# Patient Record
Sex: FEMALE | Race: WHITE | NOT HISPANIC OR LATINO | URBAN - METROPOLITAN AREA
[De-identification: names, ages, dates, MRNs, and addresses within clinical notes are randomized per-mention and may not be internally consistent; named-entity substitution may affect disease eponyms.]

---

## 2018-10-02 PROBLEM — Z00.00 ENCOUNTER FOR PREVENTIVE HEALTH EXAMINATION: Status: ACTIVE | Noted: 2018-10-02

## 2018-10-03 ENCOUNTER — OUTPATIENT (OUTPATIENT)
Dept: OUTPATIENT SERVICES | Facility: HOSPITAL | Age: 43
LOS: 1 days | Discharge: HOME | End: 2018-10-03

## 2018-10-03 DIAGNOSIS — Z12.31 ENCOUNTER FOR SCREENING MAMMOGRAM FOR MALIGNANT NEOPLASM OF BREAST: ICD-10-CM

## 2018-12-28 ENCOUNTER — APPOINTMENT (OUTPATIENT)
Dept: CARDIOLOGY | Facility: CLINIC | Age: 43
End: 2018-12-28

## 2018-12-28 VITALS
WEIGHT: 209 LBS | SYSTOLIC BLOOD PRESSURE: 132 MMHG | BODY MASS INDEX: 34.82 KG/M2 | HEIGHT: 65 IN | DIASTOLIC BLOOD PRESSURE: 80 MMHG | HEART RATE: 58 BPM

## 2018-12-28 DIAGNOSIS — R07.9 CHEST PAIN, UNSPECIFIED: ICD-10-CM

## 2018-12-28 DIAGNOSIS — Z82.49 FAMILY HISTORY OF ISCHEMIC HEART DISEASE AND OTHER DISEASES OF THE CIRCULATORY SYSTEM: ICD-10-CM

## 2018-12-28 DIAGNOSIS — I10 ESSENTIAL (PRIMARY) HYPERTENSION: ICD-10-CM

## 2018-12-28 DIAGNOSIS — R94.31 ABNORMAL ELECTROCARDIOGRAM [ECG] [EKG]: ICD-10-CM

## 2018-12-28 NOTE — REASON FOR VISIT
[Consultation] : a consultation regarding [Abnormal ECG] : an abnormal ECG [Dyspnea] : dyspnea [Hypertension] : hypertension

## 2019-01-07 NOTE — HISTORY OF PRESENT ILLNESS
[FreeTextEntry1] : 44 yo female with pmhx as below is here for abn ecg eval\par Recent visit with pmd was told of abn ecg\par LAst few months recurrent brenner with mod act-s, as well as left sided chest pain, no other cvs complains\par et is stable\par non smoker\par +  fhx of cad\par ros is otherwise as below

## 2019-01-07 NOTE — PHYSICAL EXAM
[General Appearance - Well Developed] : well developed [Normal Appearance] : normal appearance [Well Groomed] : well groomed [General Appearance - Well Nourished] : well nourished [No Deformities] : no deformities [General Appearance - In No Acute Distress] : no acute distress [Normal Oral Mucosa] : normal oral mucosa [Normal Jugular Venous A Waves Present] : normal jugular venous A waves present [Normal Jugular Venous V Waves Present] : normal jugular venous V waves present [No Jugular Venous Nino A Waves] : no jugular venous nino A waves [Respiration, Rhythm And Depth] : normal respiratory rhythm and effort [Exaggerated Use Of Accessory Muscles For Inspiration] : no accessory muscle use [Auscultation Breath Sounds / Voice Sounds] : lungs were clear to auscultation bilaterally [Heart Rate And Rhythm] : heart rate and rhythm were normal [Heart Sounds] : normal S1 and S2 [Murmurs] : no murmurs present [Abdomen Soft] : soft [Abdomen Tenderness] : non-tender [Abdomen Mass (___ Cm)] : no abdominal mass palpated [Nail Clubbing] : no clubbing of the fingernails [Cyanosis, Localized] : no localized cyanosis [Petechial Hemorrhages (___cm)] : no petechial hemorrhages [] : no ischemic changes [Skin Color & Pigmentation] : normal skin color and pigmentation [Oriented To Time, Place, And Person] : oriented to person, place, and time

## 2019-01-07 NOTE — ASSESSMENT
[FreeTextEntry1] : 44 yo female with pmhx and presentation as above\par intermediate risk for cad\par stress echo, if low risk - med rx, mod to high risk - CCTA vs cath\par Aggressive risk modif\par Heart healthy diet and exercise program\par Obtain FLP/A1C, address accordingly\par RTC post testing

## 2019-01-31 ENCOUNTER — APPOINTMENT (OUTPATIENT)
Dept: CARDIOLOGY | Facility: CLINIC | Age: 44
End: 2019-01-31

## 2019-02-25 ENCOUNTER — APPOINTMENT (OUTPATIENT)
Dept: CARDIOLOGY | Facility: CLINIC | Age: 44
End: 2019-02-25

## 2019-03-12 ENCOUNTER — INPATIENT (INPATIENT)
Facility: HOSPITAL | Age: 44
LOS: 0 days | Discharge: HOME | End: 2019-03-13
Attending: COLON & RECTAL SURGERY | Admitting: COLON & RECTAL SURGERY
Payer: COMMERCIAL

## 2019-03-12 VITALS
RESPIRATION RATE: 18 BRPM | TEMPERATURE: 98 F | DIASTOLIC BLOOD PRESSURE: 63 MMHG | SYSTOLIC BLOOD PRESSURE: 136 MMHG | OXYGEN SATURATION: 97 % | HEART RATE: 64 BPM

## 2019-03-12 LAB
ALBUMIN SERPL ELPH-MCNC: 4 G/DL — SIGNIFICANT CHANGE UP (ref 3.5–5.2)
ALP SERPL-CCNC: 45 U/L — SIGNIFICANT CHANGE UP (ref 30–115)
ALT FLD-CCNC: 23 U/L — SIGNIFICANT CHANGE UP (ref 0–41)
ANION GAP SERPL CALC-SCNC: 11 MMOL/L — SIGNIFICANT CHANGE UP (ref 7–14)
APTT BLD: 27.8 SEC — SIGNIFICANT CHANGE UP (ref 27–39.2)
AST SERPL-CCNC: 20 U/L — SIGNIFICANT CHANGE UP (ref 0–41)
BASOPHILS # BLD AUTO: 0.01 K/UL — SIGNIFICANT CHANGE UP (ref 0–0.2)
BASOPHILS NFR BLD AUTO: 0.1 % — SIGNIFICANT CHANGE UP (ref 0–1)
BILIRUB SERPL-MCNC: 0.3 MG/DL — SIGNIFICANT CHANGE UP (ref 0.2–1.2)
BLD GP AB SCN SERPL QL: SIGNIFICANT CHANGE UP
BUN SERPL-MCNC: 16 MG/DL — SIGNIFICANT CHANGE UP (ref 10–20)
CALCIUM SERPL-MCNC: 8.5 MG/DL — SIGNIFICANT CHANGE UP (ref 8.5–10.1)
CHLORIDE SERPL-SCNC: 103 MMOL/L — SIGNIFICANT CHANGE UP (ref 98–110)
CO2 SERPL-SCNC: 23 MMOL/L — SIGNIFICANT CHANGE UP (ref 17–32)
CREAT SERPL-MCNC: 1 MG/DL — SIGNIFICANT CHANGE UP (ref 0.7–1.5)
EOSINOPHIL # BLD AUTO: 0.08 K/UL — SIGNIFICANT CHANGE UP (ref 0–0.7)
EOSINOPHIL NFR BLD AUTO: 0.9 % — SIGNIFICANT CHANGE UP (ref 0–8)
GLUCOSE SERPL-MCNC: 88 MG/DL — SIGNIFICANT CHANGE UP (ref 70–99)
HCT VFR BLD CALC: 37.9 % — SIGNIFICANT CHANGE UP (ref 37–47)
HGB BLD-MCNC: 12.7 G/DL — SIGNIFICANT CHANGE UP (ref 12–16)
IMM GRANULOCYTES NFR BLD AUTO: 0.3 % — SIGNIFICANT CHANGE UP (ref 0.1–0.3)
INR BLD: 0.93 RATIO — SIGNIFICANT CHANGE UP (ref 0.65–1.3)
LACTATE SERPL-SCNC: 1 MMOL/L — SIGNIFICANT CHANGE UP (ref 0.5–2.2)
LYMPHOCYTES # BLD AUTO: 1.74 K/UL — SIGNIFICANT CHANGE UP (ref 1.2–3.4)
LYMPHOCYTES # BLD AUTO: 20 % — LOW (ref 20.5–51.1)
MCHC RBC-ENTMCNC: 29.2 PG — SIGNIFICANT CHANGE UP (ref 27–31)
MCHC RBC-ENTMCNC: 33.5 G/DL — SIGNIFICANT CHANGE UP (ref 32–37)
MCV RBC AUTO: 87.1 FL — SIGNIFICANT CHANGE UP (ref 81–99)
MONOCYTES # BLD AUTO: 0.62 K/UL — HIGH (ref 0.1–0.6)
MONOCYTES NFR BLD AUTO: 7.1 % — SIGNIFICANT CHANGE UP (ref 1.7–9.3)
NEUTROPHILS # BLD AUTO: 6.2 K/UL — SIGNIFICANT CHANGE UP (ref 1.4–6.5)
NEUTROPHILS NFR BLD AUTO: 71.6 % — SIGNIFICANT CHANGE UP (ref 42.2–75.2)
NRBC # BLD: 0 /100 WBCS — SIGNIFICANT CHANGE UP (ref 0–0)
PLATELET # BLD AUTO: 163 K/UL — SIGNIFICANT CHANGE UP (ref 130–400)
POTASSIUM SERPL-MCNC: 4.6 MMOL/L — SIGNIFICANT CHANGE UP (ref 3.5–5)
POTASSIUM SERPL-SCNC: 4.6 MMOL/L — SIGNIFICANT CHANGE UP (ref 3.5–5)
PROT SERPL-MCNC: 6.8 G/DL — SIGNIFICANT CHANGE UP (ref 6–8)
PROTHROM AB SERPL-ACNC: 10.7 SEC — SIGNIFICANT CHANGE UP (ref 9.95–12.87)
RBC # BLD: 4.35 M/UL — SIGNIFICANT CHANGE UP (ref 4.2–5.4)
RBC # FLD: 12.7 % — SIGNIFICANT CHANGE UP (ref 11.5–14.5)
SODIUM SERPL-SCNC: 137 MMOL/L — SIGNIFICANT CHANGE UP (ref 135–146)
TYPE + AB SCN PNL BLD: SIGNIFICANT CHANGE UP
WBC # BLD: 8.68 K/UL — SIGNIFICANT CHANGE UP (ref 4.8–10.8)
WBC # FLD AUTO: 8.68 K/UL — SIGNIFICANT CHANGE UP (ref 4.8–10.8)

## 2019-03-12 PROCEDURE — 46050 I&D PERIANAL ABSCESS SUPFC: CPT

## 2019-03-12 PROCEDURE — 99223 1ST HOSP IP/OBS HIGH 75: CPT | Mod: 57

## 2019-03-12 RX ORDER — METRONIDAZOLE 500 MG
500 TABLET ORAL EVERY 8 HOURS
Qty: 0 | Refills: 0 | Status: DISCONTINUED | OUTPATIENT
Start: 2019-03-12 | End: 2019-03-13

## 2019-03-12 RX ORDER — METRONIDAZOLE 500 MG
500 TABLET ORAL ONCE
Qty: 0 | Refills: 0 | Status: COMPLETED | OUTPATIENT
Start: 2019-03-12 | End: 2019-03-12

## 2019-03-12 RX ORDER — CIPROFLOXACIN LACTATE 400MG/40ML
400 VIAL (ML) INTRAVENOUS ONCE
Qty: 0 | Refills: 0 | Status: DISCONTINUED | OUTPATIENT
Start: 2019-03-12 | End: 2019-03-12

## 2019-03-12 RX ORDER — CIPROFLOXACIN LACTATE 400MG/40ML
400 VIAL (ML) INTRAVENOUS EVERY 12 HOURS
Qty: 0 | Refills: 0 | Status: DISCONTINUED | OUTPATIENT
Start: 2019-03-12 | End: 2019-03-13

## 2019-03-12 RX ORDER — CIPROFLOXACIN LACTATE 400MG/40ML
VIAL (ML) INTRAVENOUS
Qty: 0 | Refills: 0 | Status: DISCONTINUED | OUTPATIENT
Start: 2019-03-12 | End: 2019-03-12

## 2019-03-12 RX ORDER — SODIUM CHLORIDE 9 MG/ML
1000 INJECTION, SOLUTION INTRAVENOUS
Qty: 0 | Refills: 0 | Status: DISCONTINUED | OUTPATIENT
Start: 2019-03-12 | End: 2019-03-12

## 2019-03-12 RX ORDER — HYDROMORPHONE HYDROCHLORIDE 2 MG/ML
1 INJECTION INTRAMUSCULAR; INTRAVENOUS; SUBCUTANEOUS
Qty: 0 | Refills: 0 | Status: DISCONTINUED | OUTPATIENT
Start: 2019-03-12 | End: 2019-03-12

## 2019-03-12 RX ORDER — INFLUENZA VIRUS VACCINE 15; 15; 15; 15 UG/.5ML; UG/.5ML; UG/.5ML; UG/.5ML
0.5 SUSPENSION INTRAMUSCULAR ONCE
Qty: 0 | Refills: 0 | Status: DISCONTINUED | OUTPATIENT
Start: 2019-03-12 | End: 2019-03-13

## 2019-03-12 RX ORDER — IBUPROFEN 200 MG
400 TABLET ORAL EVERY 8 HOURS
Qty: 0 | Refills: 0 | Status: DISCONTINUED | OUTPATIENT
Start: 2019-03-12 | End: 2019-03-13

## 2019-03-12 RX ORDER — SODIUM CHLORIDE 9 MG/ML
1000 INJECTION, SOLUTION INTRAVENOUS ONCE
Qty: 0 | Refills: 0 | Status: COMPLETED | OUTPATIENT
Start: 2019-03-12 | End: 2019-03-12

## 2019-03-12 RX ORDER — ONDANSETRON 8 MG/1
4 TABLET, FILM COATED ORAL ONCE
Qty: 0 | Refills: 0 | Status: DISCONTINUED | OUTPATIENT
Start: 2019-03-12 | End: 2019-03-12

## 2019-03-12 RX ORDER — ONDANSETRON 8 MG/1
4 TABLET, FILM COATED ORAL ONCE
Qty: 0 | Refills: 0 | Status: COMPLETED | OUTPATIENT
Start: 2019-03-12 | End: 2019-03-12

## 2019-03-12 RX ORDER — HYDROMORPHONE HYDROCHLORIDE 2 MG/ML
0.5 INJECTION INTRAMUSCULAR; INTRAVENOUS; SUBCUTANEOUS
Qty: 0 | Refills: 0 | Status: DISCONTINUED | OUTPATIENT
Start: 2019-03-12 | End: 2019-03-12

## 2019-03-12 RX ORDER — HEPARIN SODIUM 5000 [USP'U]/ML
5000 INJECTION INTRAVENOUS; SUBCUTANEOUS EVERY 8 HOURS
Qty: 0 | Refills: 0 | Status: DISCONTINUED | OUTPATIENT
Start: 2019-03-12 | End: 2019-03-13

## 2019-03-12 RX ORDER — ACETAMINOPHEN 500 MG
650 TABLET ORAL EVERY 6 HOURS
Qty: 0 | Refills: 0 | Status: DISCONTINUED | OUTPATIENT
Start: 2019-03-12 | End: 2019-03-13

## 2019-03-12 RX ADMIN — ONDANSETRON 4 MILLIGRAM(S): 8 TABLET, FILM COATED ORAL at 12:00

## 2019-03-12 RX ADMIN — SODIUM CHLORIDE 1000 MILLILITER(S): 9 INJECTION, SOLUTION INTRAVENOUS at 12:00

## 2019-03-12 RX ADMIN — Medication 400 MILLIGRAM(S): at 22:04

## 2019-03-12 RX ADMIN — Medication 650 MILLIGRAM(S): at 18:57

## 2019-03-12 RX ADMIN — SODIUM CHLORIDE 100 MILLILITER(S): 9 INJECTION, SOLUTION INTRAVENOUS at 18:25

## 2019-03-12 RX ADMIN — Medication 650 MILLIGRAM(S): at 23:09

## 2019-03-12 RX ADMIN — Medication 650 MILLIGRAM(S): at 18:58

## 2019-03-12 RX ADMIN — Medication 200 MILLIGRAM(S): at 19:46

## 2019-03-12 RX ADMIN — HEPARIN SODIUM 5000 UNIT(S): 5000 INJECTION INTRAVENOUS; SUBCUTANEOUS at 22:04

## 2019-03-12 RX ADMIN — Medication 100 MILLIGRAM(S): at 23:10

## 2019-03-12 NOTE — H&P ADULT - NSHPPHYSICALEXAM_GEN_ALL_CORE
--------------------------------------------------------------------------------------  VITAL SIGNS, INS/OUTS (last 24 hours):  --------------------------------------------------------------------------------------  ICU Vital Signs Last 24 Hrs  T(C): 36.7 (12 Mar 2019 10:09), Max: 36.7 (12 Mar 2019 10:09)  T(F): 98.1 (12 Mar 2019 10:09), Max: 98.1 (12 Mar 2019 10:09)  HR: 64 (12 Mar 2019 10:09) (64 - 64)  BP: 136/63 (12 Mar 2019 10:09) (136/63 - 136/63)  RR: 18 (12 Mar 2019 10:09) (18 - 18)  SpO2: 97% (12 Mar 2019 10:09) (97% - 97%)  --------------------------------------------------------------------------------------  PHYSICAL EXAM  General: NAD, AAOx3, calm and cooperative  HEENT: NCAT, WNL  Cardiac: RRR S1, S2  Respiratory: CTAB, normal respiratory effort  Abdomen: Soft, non-distended, non-tender, +bowel sounds  Rectal: Good tone, + area of induration and erythema over L gluteal region with overlying tenderness, no fluctuance, no blood, no becca-anal masses, no fistulas, fissures, hemorrhoids  Skin: Warm/dry, normal color, no jaundice

## 2019-03-12 NOTE — ED PROVIDER NOTE - PHYSICAL EXAMINATION
CONSTITUTIONAL: WA / WN / NAD  HEAD: NCAT  EYES: PERRL; EOMI;   ENT: Normal pharynx; mucous membranes pink/moist, no erythema.  NECK: Supple; no meningeal signs  CARD: RRR; nl S1/S2; no M/R/G.   RESP: Respiratory rate and effort are normal; breath sounds clear and equal bilaterally.  ABD: Soft, NT ND  : Chaperoned by PA student Smith + left perneium abscess near thigh erythematous and flutuant.   MSK/EXT: No gross deformities  SKIN: Warm and dry;   NEURO: AAOx3  PSYCH: Memory Intact, Normal Affect

## 2019-03-12 NOTE — ED PROVIDER NOTE - ATTENDING CONTRIBUTION TO CARE
43 yoF hx of perenium abcsess s/p I&D long time ago, now with pain to inner L thigh/perinium.  + chills, no fever.  nontoxic, vss, afeb. abd soft, + large area of tenderness and flactuance to prox L thigh lateral to labia extending posteriorly. no crepitus, + erythema.  a/p: labs, imaging, reassess

## 2019-03-12 NOTE — ED PROVIDER NOTE - PROGRESS NOTE DETAILS
spoke with dr. reeves from surgery aware of consult. Authored by Dr. Preciado: s/o to dr carrera - f/u surgery eval completion and dispo Spoke with Dr. reeves , will admit to [facility] for OR requesting cipro/flagl & ekg

## 2019-03-12 NOTE — ED PROVIDER NOTE - OBJECTIVE STATEMENT
43 year old female presents here for multiple complaints . Patient states she's been having subjective fever and chills for the last several days. Patient notes both her children have been having flu like symptoms at home. Patient states she has a history of having abscess in her pernium which she has been taking mupriocen provided by her obgyn. In the past they have drained on their own but adolfon noticed two days ago she developed one again. 43 year old female presents here for multiple complaints . Patient states she's been having subjective fever and chills for the last several days. Patient notes both her children have been having flu like symptoms at home. Patient states she has a history of having abscess in her pernium which she has been taking mupriocen provided by her obgyn. In the past they have drained on their own but patient noticed two days ago she developed one again.

## 2019-03-12 NOTE — CONSULT NOTE ADULT - ASSESSMENT
ASSESSMENT:  43y Female with 2.4cm perianal abscess appreciated on CT, with induration and tenderness over L gluteal region on exam, normal WBC, afebrile    PLAN:     Will discuss with Attending on call  NPO  IVF  Pre-op labs: CBC, BMP, Mg, PO4, PT/PTT/INR, T+S  Will require incision and drainage of abscess      Above plan discussed with  *** , patient, and ED team    --------------------------------------------------------------------------------------    03-12-19 @ 15:39

## 2019-03-12 NOTE — H&P ADULT - ASSESSMENT
ASSESSMENT:  43y Female with 2.4cm perianal abscess appreciated on CT, with induration and tenderness over L gluteal region on exam, normal WBC, afebrile    PLAN:   NPO  IVF  IV Cipro/Flagyl (PO on DC)  Pre-op labs: CBC, BMP, Mg, PO4, PT/PTT/INR, T+S  Will require incision and drainage of abscess  Consented for OR  Added on  Likely DC home post op    Above plan discussed with Dr. Rossi , patient, and ED team    --------------------------------------------------------------------------------------    03-12-19 @ 15:39

## 2019-03-12 NOTE — ED PROVIDER NOTE - NS ED ROS FT
Constitutional: See HPI.  Eyes: No visual changes,   ENMT: No neck pain   Cardiac: No cp  Respiratory: No cough   GI: +nausea no vomiting,   MS: No myalgias  Neuro: . No LOC.  Skin: see hpi

## 2019-03-12 NOTE — ED ADULT NURSE NOTE - NSIMPLEMENTINTERV_GEN_ALL_ED
Implemented All Universal Safety Interventions:  [place] to call system. Call bell, personal items and telephone within reach. Instruct patient to call for assistance. Room bathroom lighting operational. Non-slip footwear when patient is off stretcher. Physically safe environment: no spills, clutter or unnecessary equipment. Stretcher in lowest position, wheels locked, appropriate side rails in place.

## 2019-03-12 NOTE — ED PROVIDER NOTE - CLINICAL SUMMARY MEDICAL DECISION MAKING FREE TEXT BOX
42 y/o female in ER with L becca-anal abscess.  Pt given IV abx, seen by surgery, admitted to surgical service for drainage in OR.

## 2019-03-12 NOTE — H&P ADULT - HISTORY OF PRESENT ILLNESS
HPI:   43y Female patient no PMH presents with L sided becca-anal pain for 1 day. She has experienced intermittent pain from a pustule on her left gluteal region that has been incised and drained in her Gynecologists office 6m ago, and for the last 2 days she has noticed that it was again swollen and tender in the same region. She has been applying topical mupirocin cream to the area with no resolution. She denies any bleeding, drainage, spotting, changes in bowel habits, constipation, diarrhea, or any other lesions. She does not experience pain with defaecation. She has never had a perianal abscess before and has not had a colonoscopy.    PAST MEDICAL & SURGICAL HISTORY:   section  and     Home Meds: Home Medications:  Birth control/OCPs    Allergies: Allergies  No Known Allergies  Intolerances    Soc:   Denies Tobacco/EtOH/Substance use  Advanced Directives: Presumed Full Code

## 2019-03-12 NOTE — ED ADULT TRIAGE NOTE - CHIEF COMPLAINT QUOTE
Pt presents with headaches, weakness and cough x 2 days, also c/o pain to perineal area, pt reports she has a "bump" that is collecting fluid

## 2019-03-12 NOTE — BRIEF OPERATIVE NOTE - NSICDXBRIEFPOSTOP_GEN_ALL_CORE_FT
POST-OP DIAGNOSIS:  Fistula-in-ano 12-Mar-2019 18:16:26  Abhi Ortega  Perianal abscess 12-Mar-2019 18:16:15  Abhi Ortega

## 2019-03-12 NOTE — H&P ADULT - ATTENDING COMMENTS
42 yo female with recurrent perianal / left buttock abscess.  Developed same bulge, tender, with erythema and drainage.  WC 8.7    Left becca anal / buttock abscess, tender, fluctuant.    We offered her Incision and Drainage of perianal abscess in the OR, under anesthesia.  Patient agreed with plan and signed consent.

## 2019-03-12 NOTE — CHART NOTE - NSCHARTNOTEFT_GEN_A_CORE
Post Operative Note  Patient: DEREK DANIEL 43y (1975) Female   MRN: [id number]  Location: [address]   Visit: 03-12-19 Inpatient  Date: 03-12-19 @ 22:13    Procedure: S/P incision and drainage of perianal abscess    Subjective: Patient states she is feeling fine, states the pain has improved.  Patient denies fever, chills, chest pain, shortness of breath, nausea, vomiting, abdominal pain.  Nausea: no, Vomiting: no, Ambulating: no, Flatus: no  Pain Assessment: no     Objective:  Vitals: T(F): 97.1 (03-12-19 @ 20:10), Max: 98.4 (03-12-19 @ 18:25)  HR: 73 (03-12-19 @ 20:10)  BP: 129/80 (03-12-19 @ 20:10) (115/59 - 136/63)  RR: 18 (03-12-19 @ 20:10)  SpO2: 96% (03-12-19 @ 19:30)    In:   03-12-19 @ 07:01  -  03-12-19 @ 22:13  --------------------------------------------------------  IN: 0 mL      IV Fluids:     Out:   03-12-19 @ 07:01  -  03-12-19 @ 22:13  --------------------------------------------------------  OUT: 300 mL      EBL:     Voided Urine:   03-12-19 @ 07:01  -  03-12-19 @ 22:13  --------------------------------------------------------  OUT: 300 mL    Physical Examination:  General Appearance: NAD, alert and cooperative  HEENT: NCAT,  LIBIA, EOMI  Heart: S1 and S2. No murmurs. Rhythm is regular  Lungs: Clear to auscultation BL without rales, rhonchi, wheezing or diminished breath sounds.  Abdomen:  Positive bowel sounds. Soft, nondistended, nontender    Medications: [Standing]  acetaminophen   Tablet .. 650 milliGRAM(s) Oral every 6 hours  ciprofloxacin   IVPB 400 milliGRAM(s) IV Intermittent every 12 hours  heparin  Injectable 5000 Unit(s) SubCutaneous every 8 hours  ibuprofen  Tablet. 400 milliGRAM(s) Oral every 8 hours  influenza   Vaccine 0.5 milliLiter(s) IntraMuscular once  metroNIDAZOLE  IVPB 500 milliGRAM(s) IV Intermittent every 8 hours    Medications: [PRN]  acetaminophen   Tablet .. 650 milliGRAM(s) Oral every 6 hours  ciprofloxacin   IVPB 400 milliGRAM(s) IV Intermittent every 12 hours  heparin  Injectable 5000 Unit(s) SubCutaneous every 8 hours  ibuprofen  Tablet. 400 milliGRAM(s) Oral every 8 hours  influenza   Vaccine 0.5 milliLiter(s) IntraMuscular once  metroNIDAZOLE  IVPB 500 milliGRAM(s) IV Intermittent every 8 hours    Labs:                        12.7   8.68  )-----------( 163      ( 12 Mar 2019 11:32 )             37.9     03-12    137  |  103  |  16  ----------------------------<  88  4.6   |  23  |  1.0    Ca    8.5      12 Mar 2019 11:32    TPro  6.8  /  Alb  4.0  /  TBili  0.3  /  DBili  x   /  AST  20  /  ALT  23  /  AlkPhos  45  03-12    PT/INR - ( 12 Mar 2019 16:41 )   PT: 10.70 sec;   INR: 0.93 ratio         PTT - ( 12 Mar 2019 16:41 )  PTT:27.8 sec      Imaging:  No post-op imaging studies    Assessment:  43yFemale patient S/P incision and drainage of perianal abscess    Plan:  -home meds  -pain control  -GI/DVT ppx  -encourage ambulation  -incentive spirometer  -regular diet    Date/Time: 03-12-19 @ 22:13

## 2019-03-12 NOTE — CONSULT NOTE ADULT - SUBJECTIVE AND OBJECTIVE BOX
Consultation Note  =====================================================    HPI:   43y Female patient no PMH presents with L sided becca-anal pain for 1 day. She has experienced intermittent pain from a pustule on her left gluteal region that has been incised and drained in her Gynecologists office 6m ago, and for the last 2 days she has noticed that it was again swollen and tender in the same region. She has been applying topical mupirocin cream to the area with no resolution. She denies any bleeding, drainage, spotting, changes in bowel habits, constipation, diarrhea, or any other lesions. She does not experience pain with defaecation. She has never had a perianal abscess before and has not had a colonoscopy.    PAST MEDICAL & SURGICAL HISTORY:   section  and     Home Meds: Home Medications:  Birth control/OCPs    Allergies: Allergies  No Known Allergies  Intolerances    Soc:   Denies Tobacco/EtOH/Substance use  Advanced Directives: Presumed Full Code     ROS:    REVIEW OF SYSTEMS  [ x ] A ten-point review of systems was otherwise negative except as noted.  [ ] Due to altered mental status/intubation, subjective information were not able to be obtained from the patient. History was obtained, to the extent possible, from review of the chart and collateral sources of information.    --------------------------------------------------------------------------------------  VITAL SIGNS, INS/OUTS (last 24 hours):  --------------------------------------------------------------------------------------  ICU Vital Signs Last 24 Hrs  T(C): 36.7 (12 Mar 2019 10:09), Max: 36.7 (12 Mar 2019 10:09)  T(F): 98.1 (12 Mar 2019 10:09), Max: 98.1 (12 Mar 2019 10:09)  HR: 64 (12 Mar 2019 10:09) (64 - 64)  BP: 136/63 (12 Mar 2019 10:09) (136/63 - 136/63)  RR: 18 (12 Mar 2019 10:09) (18 - 18)  SpO2: 97% (12 Mar 2019 10:09) (97% - 97%)  --------------------------------------------------------------------------------------  PHYSICAL EXAM  General: NAD, AAOx3, calm and cooperative  HEENT: NCAT, WNL  Cardiac: RRR S1, S2  Respiratory: CTAB, normal respiratory effort  Abdomen: Soft, non-distended, non-tender, +bowel sounds  Rectal: Good tone, + area of induration and erythema over L gluteal region with overlying tenderness, no fluctuance, no blood, no becca-anal masses, no fistulas, fissures, hemorrhoids  Skin: Warm/dry, normal color, no jaundice    LABS  --------------------------------------------------------------------------------------                        12.7   8.68  )-----------( 163      ( 12 Mar 2019 11:32 )             37.9       Auto Neutrophil %: 71.6 % (19 @ 11:32)  Auto Immature Granulocyte %: 0.3 % (19 @ 11:32)      137  |  103  |  16  ----------------------------<  88  4.6   |  23  |  1.0    Calcium, Total Serum: 8.5 mg/dL (19 @ 11:32)    LFTs:          6.8  | 0.3  | 20       ------------------[45      ( 12 Mar 2019 11:32 )  4.0  | x    | 23          Lipase:x      Amylase:x         Lactate, Blood: 1.0 mmol/L (19 @ 11:32)    Coags:    --------------------------------------------------------------------------------------  IMAGING RESULTS  < from: CT Pelvis w/ IV Cont (19 @ 13:42) >  BONES/SOFT TISSUES: A 2.4 x 1.9 x 1.6 cm thick-walled left perianal fluid   collection extends from the inferior portion of the left anal canal to   the subcutaneous soft tissues of the medial left buttock. No acute   osseous abnormality.  IMPRESSION:   1. Thick-walled 2.4cm left perianal collection with air-fluid level,   compatible with abscess.  < end of copied text >  ---------------------------------------------------------------------------------------

## 2019-03-12 NOTE — H&P ADULT - NSHPREVIEWOFSYSTEMS_GEN_ALL_CORE
ROS:    REVIEW OF SYSTEMS  [ x ] A ten-point review of systems was otherwise negative except as noted.  [ ] Due to altered mental status/intubation, subjective information were not able to be obtained from the patient. History was obtained, to the extent possible, from review of the chart and collateral sources of information.

## 2019-03-12 NOTE — CHART NOTE - NSCHARTNOTEFT_GEN_A_CORE
PACU ANESTHESIA ADMISSION NOTE      ____ Intubated  TV:______       Rate: ______      FiO2: ______    _x___ Patent Airway    _x___ Full return of protective reflexes    ____ Full recovery from anesthesia / sedation to baseline status    Vitals:  HR 92  /59  RR 15  O2sat. 99%  Temp: 36.5C      Mental Status:  ___x_ Awake   __x___ Alert   _____ Drowsy   _____ Sedated    Nausea/Vomiting: ____ Yes, See Post - Op Orders      __x__ No    Pain Scale (0-10): ___x__    Treatment: ____ None    __x__ See Post - Op/PCA Orders    Post - Operative Fluids:   ____ Oral   ___x_ See Post - Op Orders    Plan:  Discharge to:   ____Home       __x___Floor      _____Critical Care    _____ Other:_________________    Comments: s/p general anesthesia with LMA. No anesthesia complications. Pt's condition is stable in PACU. Full report is given to PACU RN.

## 2019-03-12 NOTE — H&P ADULT - NSHPLABSRESULTS_GEN_ALL_CORE
LABS  --------------------------------------------------------------------------------------                        12.7   8.68  )-----------( 163      ( 12 Mar 2019 11:32 )             37.9       Auto Neutrophil %: 71.6 % (03-12-19 @ 11:32)  Auto Immature Granulocyte %: 0.3 % (03-12-19 @ 11:32)    03-12  137  |  103  |  16  ----------------------------<  88  4.6   |  23  |  1.0    Calcium, Total Serum: 8.5 mg/dL (03-12-19 @ 11:32)    LFTs:          6.8  | 0.3  | 20       ------------------[45      ( 12 Mar 2019 11:32 )  4.0  | x    | 23          Lipase:x      Amylase:x         Lactate, Blood: 1.0 mmol/L (03-12-19 @ 11:32)    Coags:    --------------------------------------------------------------------------------------  IMAGING RESULTS  < from: CT Pelvis w/ IV Cont (03.12.19 @ 13:42) >  BONES/SOFT TISSUES: A 2.4 x 1.9 x 1.6 cm thick-walled left perianal fluid   collection extends from the inferior portion of the left anal canal to   the subcutaneous soft tissues of the medial left buttock. No acute   osseous abnormality.  IMPRESSION:   1. Thick-walled 2.4cm left perianal collection with air-fluid level,   compatible with abscess.  < end of copied text >  ---------------------------------------------------------------------------------------

## 2019-03-13 ENCOUNTER — TRANSCRIPTION ENCOUNTER (OUTPATIENT)
Age: 44
End: 2019-03-13

## 2019-03-13 VITALS
RESPIRATION RATE: 18 BRPM | DIASTOLIC BLOOD PRESSURE: 68 MMHG | TEMPERATURE: 98 F | HEART RATE: 70 BPM | SYSTOLIC BLOOD PRESSURE: 130 MMHG

## 2019-03-13 LAB
ANION GAP SERPL CALC-SCNC: 8 MMOL/L — SIGNIFICANT CHANGE UP (ref 7–14)
BASOPHILS # BLD AUTO: 0.01 K/UL — SIGNIFICANT CHANGE UP (ref 0–0.2)
BASOPHILS NFR BLD AUTO: 0.1 % — SIGNIFICANT CHANGE UP (ref 0–1)
BUN SERPL-MCNC: 13 MG/DL — SIGNIFICANT CHANGE UP (ref 10–20)
CALCIUM SERPL-MCNC: 8.3 MG/DL — LOW (ref 8.5–10.1)
CHLORIDE SERPL-SCNC: 104 MMOL/L — SIGNIFICANT CHANGE UP (ref 98–110)
CO2 SERPL-SCNC: 25 MMOL/L — SIGNIFICANT CHANGE UP (ref 17–32)
CREAT SERPL-MCNC: 1.1 MG/DL — SIGNIFICANT CHANGE UP (ref 0.7–1.5)
EOSINOPHIL # BLD AUTO: 0 K/UL — SIGNIFICANT CHANGE UP (ref 0–0.7)
EOSINOPHIL NFR BLD AUTO: 0 % — SIGNIFICANT CHANGE UP (ref 0–8)
GLUCOSE SERPL-MCNC: 155 MG/DL — HIGH (ref 70–99)
HCT VFR BLD CALC: 34.7 % — LOW (ref 37–47)
HGB BLD-MCNC: 11.9 G/DL — LOW (ref 12–16)
IMM GRANULOCYTES NFR BLD AUTO: 0.4 % — HIGH (ref 0.1–0.3)
LYMPHOCYTES # BLD AUTO: 0.89 K/UL — LOW (ref 1.2–3.4)
LYMPHOCYTES # BLD AUTO: 12.3 % — LOW (ref 20.5–51.1)
MAGNESIUM SERPL-MCNC: 1.9 MG/DL — SIGNIFICANT CHANGE UP (ref 1.8–2.4)
MCHC RBC-ENTMCNC: 29.5 PG — SIGNIFICANT CHANGE UP (ref 27–31)
MCHC RBC-ENTMCNC: 34.3 G/DL — SIGNIFICANT CHANGE UP (ref 32–37)
MCV RBC AUTO: 85.9 FL — SIGNIFICANT CHANGE UP (ref 81–99)
MONOCYTES # BLD AUTO: 0.14 K/UL — SIGNIFICANT CHANGE UP (ref 0.1–0.6)
MONOCYTES NFR BLD AUTO: 1.9 % — SIGNIFICANT CHANGE UP (ref 1.7–9.3)
NEUTROPHILS # BLD AUTO: 6.18 K/UL — SIGNIFICANT CHANGE UP (ref 1.4–6.5)
NEUTROPHILS NFR BLD AUTO: 85.3 % — HIGH (ref 42.2–75.2)
NRBC # BLD: 0 /100 WBCS — SIGNIFICANT CHANGE UP (ref 0–0)
PHOSPHATE SERPL-MCNC: 2.8 MG/DL — SIGNIFICANT CHANGE UP (ref 2.1–4.9)
PLATELET # BLD AUTO: 157 K/UL — SIGNIFICANT CHANGE UP (ref 130–400)
POTASSIUM SERPL-MCNC: 4.5 MMOL/L — SIGNIFICANT CHANGE UP (ref 3.5–5)
POTASSIUM SERPL-SCNC: 4.5 MMOL/L — SIGNIFICANT CHANGE UP (ref 3.5–5)
RBC # BLD: 4.04 M/UL — LOW (ref 4.2–5.4)
RBC # FLD: 12.5 % — SIGNIFICANT CHANGE UP (ref 11.5–14.5)
SODIUM SERPL-SCNC: 137 MMOL/L — SIGNIFICANT CHANGE UP (ref 135–146)
WBC # BLD: 7.25 K/UL — SIGNIFICANT CHANGE UP (ref 4.8–10.8)
WBC # FLD AUTO: 7.25 K/UL — SIGNIFICANT CHANGE UP (ref 4.8–10.8)

## 2019-03-13 PROCEDURE — 99024 POSTOP FOLLOW-UP VISIT: CPT

## 2019-03-13 RX ORDER — ACETAMINOPHEN 500 MG
2 TABLET ORAL
Qty: 0 | Refills: 0 | COMMUNITY
Start: 2019-03-13

## 2019-03-13 RX ORDER — OXYCODONE HYDROCHLORIDE 5 MG/1
1 TABLET ORAL
Qty: 8 | Refills: 0 | OUTPATIENT
Start: 2019-03-13

## 2019-03-13 RX ORDER — MOXIFLOXACIN HYDROCHLORIDE TABLETS, 400 MG 400 MG/1
1 TABLET, FILM COATED ORAL
Qty: 10 | Refills: 0 | OUTPATIENT
Start: 2019-03-13 | End: 2019-03-17

## 2019-03-13 RX ORDER — MORPHINE SULFATE 50 MG/1
2 CAPSULE, EXTENDED RELEASE ORAL ONCE
Qty: 0 | Refills: 0 | Status: DISCONTINUED | OUTPATIENT
Start: 2019-03-13 | End: 2019-03-13

## 2019-03-13 RX ORDER — PANTOPRAZOLE SODIUM 20 MG/1
40 TABLET, DELAYED RELEASE ORAL DAILY
Qty: 0 | Refills: 0 | Status: DISCONTINUED | OUTPATIENT
Start: 2019-03-13 | End: 2019-03-13

## 2019-03-13 RX ORDER — METRONIDAZOLE 500 MG
1 TABLET ORAL
Qty: 15 | Refills: 0 | OUTPATIENT
Start: 2019-03-13 | End: 2019-03-17

## 2019-03-13 RX ORDER — IBUPROFEN 200 MG
1 TABLET ORAL
Qty: 0 | Refills: 0 | COMMUNITY
Start: 2019-03-13

## 2019-03-13 RX ADMIN — MORPHINE SULFATE 2 MILLIGRAM(S): 50 CAPSULE, EXTENDED RELEASE ORAL at 10:00

## 2019-03-13 RX ADMIN — HEPARIN SODIUM 5000 UNIT(S): 5000 INJECTION INTRAVENOUS; SUBCUTANEOUS at 13:38

## 2019-03-13 RX ADMIN — Medication 400 MILLIGRAM(S): at 13:37

## 2019-03-13 RX ADMIN — Medication 650 MILLIGRAM(S): at 05:20

## 2019-03-13 RX ADMIN — Medication 650 MILLIGRAM(S): at 11:11

## 2019-03-13 RX ADMIN — Medication 400 MILLIGRAM(S): at 05:20

## 2019-03-13 RX ADMIN — PANTOPRAZOLE SODIUM 40 MILLIGRAM(S): 20 TABLET, DELAYED RELEASE ORAL at 11:11

## 2019-03-13 RX ADMIN — MORPHINE SULFATE 2 MILLIGRAM(S): 50 CAPSULE, EXTENDED RELEASE ORAL at 09:10

## 2019-03-13 RX ADMIN — Medication 100 MILLIGRAM(S): at 13:37

## 2019-03-13 RX ADMIN — Medication 650 MILLIGRAM(S): at 12:00

## 2019-03-13 RX ADMIN — Medication 400 MILLIGRAM(S): at 13:43

## 2019-03-13 RX ADMIN — HEPARIN SODIUM 5000 UNIT(S): 5000 INJECTION INTRAVENOUS; SUBCUTANEOUS at 05:19

## 2019-03-13 RX ADMIN — Medication 100 MILLIGRAM(S): at 05:19

## 2019-03-13 RX ADMIN — Medication 200 MILLIGRAM(S): at 09:10

## 2019-03-13 NOTE — PROGRESS NOTE ADULT - SUBJECTIVE AND OBJECTIVE BOX
Progress Note: Surgery  Patient: DEREK DANIEL , 43y (1975)Female   MRN: [id number]  Location: [address]  Visit: 03-12-19 Inpatient  Date: 03-13-19 @ 05:22    Procedure/Injury: s/p lap appy    Events over 24h:  patient doing well post-op, tolerating regular diet, ambulating     Vitals: T(F): 98.2 (03-12-19 @ 23:30), Max: 98.4 (03-12-19 @ 18:25)  HR: 71 (03-12-19 @ 23:30)  BP: 100/59 (03-12-19 @ 23:30) (100/59 - 136/63)  RR: 18 (03-12-19 @ 23:30)  SpO2: 96% (03-12-19 @ 19:30)    Diet: Diet, Regular (03-12-19 @ 18:31)      Bowel function:   Bowel movement [no]   Flatus [no]    Out:   03-12-19 @ 07:01  -  03-13-19 @ 05:22  --------------------------------------------------------  OUT:    Voided: 300 mL  Total OUT: 300 mL    Physical Examination:  General: NAD, lying in bed comfortably   HEENT: NCAT, LIBIA, WNL  Heart: S1 S2, No MRG RRR   Lungs: CTABL +BS Equal BL, No WRC  Abdomen: Soft, non-distended, tender near incisions  Incisions/Wounds: Dressings in place, clean, dry and intact, no signs of infection/active bleeding/drainage    Medications: [Standing]  acetaminophen   Tablet .. 650 milliGRAM(s) Oral every 6 hours  ciprofloxacin   IVPB 400 milliGRAM(s) IV Intermittent every 12 hours  heparin  Injectable 5000 Unit(s) SubCutaneous every 8 hours  ibuprofen  Tablet. 400 milliGRAM(s) Oral every 8 hours  influenza   Vaccine 0.5 milliLiter(s) IntraMuscular once  metroNIDAZOLE  IVPB 500 milliGRAM(s) IV Intermittent every 8 hours  pantoprazole  Injectable 40 milliGRAM(s) IV Push daily    Medications:[PRN]    Labs:                        11.9   7.25  )-----------( 157      ( 13 Mar 2019 01:26 )             34.7     03-13    137  |  104  |  13  ----------------------------<  155<H>  4.5   |  25  |  1.1    Ca    8.3<L>      13 Mar 2019 01:26  Phos  2.8     03-13  Mg     1.9     03-13    TPro  6.8  /  Alb  4.0  /  TBili  0.3  /  DBili  x   /  AST  20  /  ALT  23  /  AlkPhos  45  03-12    LIVER FUNCTIONS - ( 12 Mar 2019 11:32 )  Alb: 4.0 g/dL / Pro: 6.8 g/dL / ALK PHOS: 45 U/L / ALT: 23 U/L / AST: 20 U/L / GGT: x           PT/INR - ( 12 Mar 2019 16:41 )   PT: 10.70 sec;   INR: 0.93 ratio    PTT - ( 12 Mar 2019 16:41 )  PTT:27.8 sec    Date/Time: 03-13-19 @ 05:22

## 2019-03-13 NOTE — DISCHARGE NOTE PROVIDER - HOSPITAL COURSE
43y Female patient no PMH presents with L sided becca-anal pain for 1 day. She has experienced intermittent pain from a pustule on her left gluteal region that has been incised and drained in her Gynecologists office 6m ago, and for the last 2 days she has noticed that it was again swollen and tender in the same region. She has been applying topical mupirocin cream to the area with no resolution. She denies any bleeding, drainage, spotting, changes in bowel habits, constipation, diarrhea, or any other lesions. She does not experience pain with defaecation. She has never had a perianal abscess before and has not had a colonoscopy.        pt admitted to surgery service and treated with IV antibiotics and underwent Incision and drainage of perianal abscess on 12-Mar-2019.    Post op pt doing well and transferred to floor.    On 3/13/19 pt without complaints tolerated diet and packing changed . Per Dr Rossi pt discharged home with VNS in stable condition .pt advised to follow up with Dr Rossi in 1 week    Advised to continue cipro and flagyl as prescribed. precaution provided

## 2019-03-13 NOTE — DISCHARGE NOTE PROVIDER - CARE PROVIDER_API CALL
Matt Rossi)  Surgery  [address] [place] [address], [address] [place] [address]  Phone: [phone]  Fax: [phone]  Follow Up Time:

## 2019-03-13 NOTE — DISCHARGE NOTE NURSING/CASE MANAGEMENT/SOCIAL WORK - NSDCDPATPORTLINK_GEN_ALL_CORE
You can access the [email or web address], offered by [facility], by registering with the following website: [email or web address]

## 2019-03-13 NOTE — PROGRESS NOTE ADULT - ASSESSMENT
Assessment:  43y Female patient admitted S/P lap appy     Plan:  - IVL  - If patient continues to tolerate diet, d/c home today  - DVT & GI PPX  - Encourage Ambulation and IS

## 2019-03-13 NOTE — PROGRESS NOTE ADULT - ATTENDING COMMENTS
stable and afebrile.  packing removed.  feels better.  tolerates PO.  D/c home on Cipro and flagyl.  followup in my office next Monday.  she agreed with plan.

## 2019-03-13 NOTE — DISCHARGE NOTE PROVIDER - NSDCFUADDINST_GEN_ALL_CORE_FT
follow up with Dr Rossi in 1 week for wound check  keep wound clean and dry  packing change daily by VNS  continue  antibiotics   no strenuous activity  if experience fever, shortness of breath, chest pain , dizziness call MD or return to ED follow up with Dr Rossi on Monday 3/18/19 for wound check   keep wound clean and dry  packing change daily by VNS  continue  antibiotics   no strenuous activity  if experience fever, shortness of breath, chest pain , dizziness call MD or return to ED

## 2019-03-14 ENCOUNTER — CHART COPY (OUTPATIENT)
Age: 44
End: 2019-03-14

## 2019-03-15 DIAGNOSIS — K61.0 ANAL ABSCESS: ICD-10-CM

## 2019-03-18 ENCOUNTER — APPOINTMENT (OUTPATIENT)
Dept: SURGERY | Facility: CLINIC | Age: 44
End: 2019-03-18
Payer: COMMERCIAL

## 2019-03-18 VITALS
SYSTOLIC BLOOD PRESSURE: 126 MMHG | BODY MASS INDEX: 33.32 KG/M2 | TEMPERATURE: 98.6 F | WEIGHT: 200 LBS | HEART RATE: 101 BPM | HEIGHT: 65 IN | DIASTOLIC BLOOD PRESSURE: 78 MMHG

## 2019-03-18 PROCEDURE — 99024 POSTOP FOLLOW-UP VISIT: CPT

## 2019-03-19 NOTE — PHYSICAL EXAM
[Normal] : supple, no neck mass and thyroid not enlarged [Normal] : oriented to person, place and time, with appropriate affect [FreeTextEntry1] : 3 o'clock, left side 2 x 2 cm, packed open. [de-identified] : left perineal wound, 3 O'clock .

## 2019-03-19 NOTE — HISTORY OF PRESENT ILLNESS
[de-identified] : 42 yo female patient with nos significant medical history, developed a perianal abscess. She underwent incision and drainage last week. 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 She is undergoing local wound care. Denies any other symptoms. No incontinence. On Cipro and Flagyl. Today for followup.

## 2019-03-19 NOTE — ASSESSMENT
[FreeTextEntry1] : 44 yo female patient with nos significant medical history, developed a perianal abscess. She underwent incision and drainage last week. 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 She is undergoing local wound care. Denies any other symptoms. No incontinence. On Cipro and Flagyl. Today for followup.\par \par Assessment and Plan:\par \par S/p Incision and drainage of left perianal abscess.\par E. coli in culture, sensitive to Cipro.\par Continue local wound care and finish course of antibiotics.\par \par Return in 2 week for Dr. Medrano to evaluate anal fistula.\par \par All the questions were answered to their satisfaction and I encouraged the patient to call my office at anytime if she had any questions. Plan of care fully discussed with the patient. \par

## 2019-04-09 ENCOUNTER — APPOINTMENT (OUTPATIENT)
Dept: SURGERY | Facility: CLINIC | Age: 44
End: 2019-04-09
Payer: COMMERCIAL

## 2019-04-09 VITALS
HEIGHT: 65 IN | WEIGHT: 203 LBS | SYSTOLIC BLOOD PRESSURE: 122 MMHG | BODY MASS INDEX: 33.82 KG/M2 | DIASTOLIC BLOOD PRESSURE: 74 MMHG

## 2019-04-09 DIAGNOSIS — K61.0 ANAL ABSCESS: ICD-10-CM

## 2019-04-09 PROCEDURE — 99214 OFFICE O/P EST MOD 30 MIN: CPT

## 2019-04-09 NOTE — HISTORY OF PRESENT ILLNESS
[FreeTextEntry1] : This is a new patient visit for Ms. Ms. RAINES with complaint of an anal fistula. Ms. RUBIO he recently had an incision and drainage of perirectal. She states it was resolving without difficulty although notes continued drainage. She is referred now for evaluation for an anal fistula.

## 2019-04-09 NOTE — PHYSICAL EXAM
[FreeTextEntry1] : Looks and feels well.\par \par Findings noted above.\par \par Examination of the perineum reveals a punctate lesion in the left side.

## 2019-04-09 NOTE — ASSESSMENT
[FreeTextEntry1] : Ms. DANIEL likely has an anal fistula. She's only recently had I&D of her perirectal abscess (3-4 weeks. I will but this continued to heal and reevaluate her in 3 weeks. I've advised her that she most likely has a fistula that the will need additional surgical management.

## 2019-05-28 ENCOUNTER — APPOINTMENT (OUTPATIENT)
Dept: SURGERY | Facility: CLINIC | Age: 44
End: 2019-05-28
Payer: COMMERCIAL

## 2019-05-28 VITALS
BODY MASS INDEX: 33.82 KG/M2 | HEIGHT: 65 IN | WEIGHT: 203 LBS | DIASTOLIC BLOOD PRESSURE: 74 MMHG | SYSTOLIC BLOOD PRESSURE: 122 MMHG

## 2019-05-28 PROCEDURE — 99214 OFFICE O/P EST MOD 30 MIN: CPT

## 2019-05-30 NOTE — ASSESSMENT
[FreeTextEntry1] : Ms. DANIEL likely has an anal fistula.  I've advised her that she most likely has a fistula that the will need additional surgical management.  The risks benefits and alternative were discussed. and all questions were answered.  Ms Daniel states that she would like to hold off on surgery for now and schedule it after she gets back from vacation.  I have advised her that she could have flare ups during that time.  I will give her antibiotics to take with her on vacation.  She will see me prior to going on vacation.

## 2019-05-30 NOTE — HISTORY OF PRESENT ILLNESS
[FreeTextEntry1] : This is a follow up visit for Ms. DANIEL who has an anal fistula. Ms. DANIEL he recently had an incision and drainage of perirectal. She states it was resolving without difficulty although notes continued drainage. Arlene has no new problems and feels she is much better than her last visit.

## 2019-07-16 ENCOUNTER — APPOINTMENT (OUTPATIENT)
Dept: SURGERY | Facility: CLINIC | Age: 44
End: 2019-07-16
Payer: COMMERCIAL

## 2019-07-16 VITALS
HEIGHT: 65 IN | BODY MASS INDEX: 34.16 KG/M2 | DIASTOLIC BLOOD PRESSURE: 76 MMHG | SYSTOLIC BLOOD PRESSURE: 118 MMHG | WEIGHT: 205 LBS

## 2019-07-16 PROCEDURE — 99214 OFFICE O/P EST MOD 30 MIN: CPT

## 2019-07-31 NOTE — ASSESSMENT
[FreeTextEntry1] : Ms. DANIEL likely has an anal fistula.  She is going on medication and not will defer definitive treatment Tole she comes back. We've previously discussed the nature and extent of surgery. All questions were answered. Oral antibiotics were given to her to a tic on vacation in the event that she has a flareup. She'll return to see me when she is back from vacation.

## 2019-07-31 NOTE — HISTORY OF PRESENT ILLNESS
[FreeTextEntry1] : This is a follow up visit for Ms. DANIEL who has an anal fistula. Ms. DANIEL he recently had an incision and drainage of perirectal. She states it was resolving without difficulty although notes continued drainage. Arlene has no new problems and feels she is much better than her last visit.  She is here for routine followup will be going vacation.

## 2019-08-02 ENCOUNTER — MESSAGE (OUTPATIENT)
Age: 44
End: 2019-08-02

## 2019-09-11 ENCOUNTER — MESSAGE (OUTPATIENT)
Age: 44
End: 2019-09-11

## 2020-01-31 ENCOUNTER — APPOINTMENT (OUTPATIENT)
Dept: ENDOCRINOLOGY | Facility: CLINIC | Age: 45
End: 2020-01-31
Payer: COMMERCIAL

## 2020-01-31 VITALS
SYSTOLIC BLOOD PRESSURE: 125 MMHG | BODY MASS INDEX: 35.59 KG/M2 | OXYGEN SATURATION: 97 % | DIASTOLIC BLOOD PRESSURE: 77 MMHG | HEIGHT: 64.57 IN | TEMPERATURE: 98.3 F | WEIGHT: 211 LBS | HEART RATE: 79 BPM

## 2020-01-31 PROCEDURE — 99204 OFFICE O/P NEW MOD 45 MIN: CPT

## 2020-01-31 RX ORDER — CIPROFLOXACIN HYDROCHLORIDE 500 MG/1
500 TABLET, FILM COATED ORAL
Qty: 20 | Refills: 1 | Status: COMPLETED | COMMUNITY
Start: 2019-07-16 | End: 2020-01-31

## 2020-01-31 RX ORDER — METRONIDAZOLE 500 MG/1
500 TABLET ORAL 3 TIMES DAILY
Qty: 30 | Refills: 1 | Status: COMPLETED | COMMUNITY
Start: 2019-07-16 | End: 2020-01-31

## 2020-03-30 ENCOUNTER — APPOINTMENT (OUTPATIENT)
Dept: ENDOCRINOLOGY | Facility: CLINIC | Age: 45
End: 2020-03-30

## 2020-05-22 LAB — TSH SERPL-ACNC: 2.98 UIU/ML

## 2020-05-26 ENCOUNTER — APPOINTMENT (OUTPATIENT)
Dept: ENDOCRINOLOGY | Facility: CLINIC | Age: 45
End: 2020-05-26
Payer: COMMERCIAL

## 2020-05-26 PROCEDURE — 99441: CPT

## 2020-05-28 NOTE — ASSESSMENT
[FreeTextEntry1] : 44yoF h/o HTN, hypothyroidism, irregular periods.\par \par #Hypothyroidism: \par -Continue levothyroxine 50mcg daily and recheck TFTs in 3-6 months\par \par #Irregular periods: Bloodwork showed that she is not in menopause. Periods have gotten more regular since she started LT4.

## 2020-05-28 NOTE — HISTORY OF PRESENT ILLNESS
[Home] : at home, [unfilled] , at the time of the visit. [Medical Office: ([facility])___] : at the medical office located in  [Verbal consent obtained from patient] : the patient, [unfilled] [FreeTextEntry1] : 44yoF h/o HTN, hypothyroidism.\par \par #Hypothyroidism: \par -family history of thyroid disease: son with hypothyroidism\par -In 2004 she had borderline hypothyroidism and started Synthroid but then stopped soon after. In Jan 2020 was found to have hypothyroidism on bloodwork done for irregular periods. \par -she started LT4 50mcg daily on 2/1/20. Became euthyroid on 5/22/20. Weight is currently stable. \par \par #Periods: more regular and lighter. LMP 4/26/20.  \par \par ROS: Per HPI. Review of constitutional, eyes, ENT, cardiovascular, respiratory, gastrointestinal, genitourinary, musculoskeletal, integumentary, neurological, psychiatric, endocrine and heme/lymph systems is otherwise negative.

## 2020-05-28 NOTE — DATA REVIEWED
[FreeTextEntry1] : 5/22/20: on LT4 50mcg daily: TSH 2.98\par \par 1/28/2020: TSH 6.08, free T4 0.81, LH 10.1, FSH 14.4, estradiol 32.5, HCG <1

## 2020-09-03 NOTE — REASON FOR VISIT
Alcoholism in recovery    BPH (benign prostatic hyperplasia)    CHF (congestive heart failure)    SSS (sick sinus syndrome) [Follow-Up Visit] : a follow-up visit for [FreeTextEntry2] : perianal abscess and fistula in-ano

## 2020-10-19 ENCOUNTER — APPOINTMENT (OUTPATIENT)
Dept: ENDOCRINOLOGY | Facility: CLINIC | Age: 45
End: 2020-10-19

## 2020-11-25 ENCOUNTER — APPOINTMENT (OUTPATIENT)
Dept: ENDOCRINOLOGY | Facility: CLINIC | Age: 45
End: 2020-11-25
Payer: COMMERCIAL

## 2020-11-25 VITALS
WEIGHT: 225 LBS | BODY MASS INDEX: 37.94 KG/M2 | HEART RATE: 83 BPM | DIASTOLIC BLOOD PRESSURE: 98 MMHG | SYSTOLIC BLOOD PRESSURE: 140 MMHG

## 2020-11-25 DIAGNOSIS — E03.9 HYPOTHYROIDISM, UNSPECIFIED: ICD-10-CM

## 2020-11-25 DIAGNOSIS — N92.6 IRREGULAR MENSTRUATION, UNSPECIFIED: ICD-10-CM

## 2020-11-25 PROCEDURE — 99214 OFFICE O/P EST MOD 30 MIN: CPT

## 2021-12-30 ENCOUNTER — EMERGENCY (EMERGENCY)
Facility: HOSPITAL | Age: 46
LOS: 0 days | Discharge: HOME | End: 2021-12-30
Attending: EMERGENCY MEDICINE | Admitting: EMERGENCY MEDICINE
Payer: COMMERCIAL

## 2021-12-30 VITALS
DIASTOLIC BLOOD PRESSURE: 80 MMHG | HEIGHT: 65 IN | RESPIRATION RATE: 18 BRPM | TEMPERATURE: 98 F | SYSTOLIC BLOOD PRESSURE: 128 MMHG | WEIGHT: 205.03 LBS | OXYGEN SATURATION: 100 % | HEART RATE: 78 BPM

## 2021-12-30 DIAGNOSIS — K61.0 ANAL ABSCESS: ICD-10-CM

## 2021-12-30 DIAGNOSIS — K62.89 OTHER SPECIFIED DISEASES OF ANUS AND RECTUM: ICD-10-CM

## 2021-12-30 LAB
ALBUMIN SERPL ELPH-MCNC: 4.5 G/DL — SIGNIFICANT CHANGE UP (ref 3.5–5.2)
ALP SERPL-CCNC: 80 U/L — SIGNIFICANT CHANGE UP (ref 30–115)
ALT FLD-CCNC: 26 U/L — SIGNIFICANT CHANGE UP (ref 0–41)
ANION GAP SERPL CALC-SCNC: 16 MMOL/L — HIGH (ref 7–14)
AST SERPL-CCNC: 22 U/L — SIGNIFICANT CHANGE UP (ref 0–41)
BASOPHILS # BLD AUTO: 0.04 K/UL — SIGNIFICANT CHANGE UP (ref 0–0.2)
BASOPHILS NFR BLD AUTO: 0.5 % — SIGNIFICANT CHANGE UP (ref 0–1)
BILIRUB SERPL-MCNC: 0.3 MG/DL — SIGNIFICANT CHANGE UP (ref 0.2–1.2)
BUN SERPL-MCNC: 15 MG/DL — SIGNIFICANT CHANGE UP (ref 10–20)
CALCIUM SERPL-MCNC: 9.1 MG/DL — SIGNIFICANT CHANGE UP (ref 8.5–10.1)
CHLORIDE SERPL-SCNC: 102 MMOL/L — SIGNIFICANT CHANGE UP (ref 98–110)
CO2 SERPL-SCNC: 20 MMOL/L — SIGNIFICANT CHANGE UP (ref 17–32)
CREAT SERPL-MCNC: 0.9 MG/DL — SIGNIFICANT CHANGE UP (ref 0.7–1.5)
EOSINOPHIL # BLD AUTO: 0.41 K/UL — SIGNIFICANT CHANGE UP (ref 0–0.7)
EOSINOPHIL NFR BLD AUTO: 5.1 % — SIGNIFICANT CHANGE UP (ref 0–8)
GLUCOSE SERPL-MCNC: 87 MG/DL — SIGNIFICANT CHANGE UP (ref 70–99)
HCG SERPL QL: NEGATIVE — SIGNIFICANT CHANGE UP
HCT VFR BLD CALC: 43 % — SIGNIFICANT CHANGE UP (ref 37–47)
HGB BLD-MCNC: 14.3 G/DL — SIGNIFICANT CHANGE UP (ref 12–16)
IMM GRANULOCYTES NFR BLD AUTO: 0.4 % — HIGH (ref 0.1–0.3)
LACTATE SERPL-SCNC: 1.3 MMOL/L — SIGNIFICANT CHANGE UP (ref 0.7–2)
LYMPHOCYTES # BLD AUTO: 2.02 K/UL — SIGNIFICANT CHANGE UP (ref 1.2–3.4)
LYMPHOCYTES # BLD AUTO: 25.3 % — SIGNIFICANT CHANGE UP (ref 20.5–51.1)
MCHC RBC-ENTMCNC: 29.2 PG — SIGNIFICANT CHANGE UP (ref 27–31)
MCHC RBC-ENTMCNC: 33.3 G/DL — SIGNIFICANT CHANGE UP (ref 32–37)
MCV RBC AUTO: 87.9 FL — SIGNIFICANT CHANGE UP (ref 81–99)
MONOCYTES # BLD AUTO: 0.64 K/UL — HIGH (ref 0.1–0.6)
MONOCYTES NFR BLD AUTO: 8 % — SIGNIFICANT CHANGE UP (ref 1.7–9.3)
NEUTROPHILS # BLD AUTO: 4.84 K/UL — SIGNIFICANT CHANGE UP (ref 1.4–6.5)
NEUTROPHILS NFR BLD AUTO: 60.7 % — SIGNIFICANT CHANGE UP (ref 42.2–75.2)
NRBC # BLD: 0 /100 WBCS — SIGNIFICANT CHANGE UP (ref 0–0)
PLATELET # BLD AUTO: 198 K/UL — SIGNIFICANT CHANGE UP (ref 130–400)
POTASSIUM SERPL-MCNC: 4.8 MMOL/L — SIGNIFICANT CHANGE UP (ref 3.5–5)
POTASSIUM SERPL-SCNC: 4.8 MMOL/L — SIGNIFICANT CHANGE UP (ref 3.5–5)
PROT SERPL-MCNC: 7.4 G/DL — SIGNIFICANT CHANGE UP (ref 6–8)
RBC # BLD: 4.89 M/UL — SIGNIFICANT CHANGE UP (ref 4.2–5.4)
RBC # FLD: 12.8 % — SIGNIFICANT CHANGE UP (ref 11.5–14.5)
SODIUM SERPL-SCNC: 138 MMOL/L — SIGNIFICANT CHANGE UP (ref 135–146)
WBC # BLD: 7.98 K/UL — SIGNIFICANT CHANGE UP (ref 4.8–10.8)
WBC # FLD AUTO: 7.98 K/UL — SIGNIFICANT CHANGE UP (ref 4.8–10.8)

## 2021-12-30 PROCEDURE — 74177 CT ABD & PELVIS W/CONTRAST: CPT | Mod: 26,MA

## 2021-12-30 PROCEDURE — 99285 EMERGENCY DEPT VISIT HI MDM: CPT

## 2021-12-30 RX ORDER — KETOROLAC TROMETHAMINE 30 MG/ML
1 SYRINGE (ML) INJECTION
Qty: 20 | Refills: 0
Start: 2021-12-30 | End: 2022-01-03

## 2021-12-30 RX ORDER — SODIUM CHLORIDE 9 MG/ML
1000 INJECTION, SOLUTION INTRAVENOUS ONCE
Refills: 0 | Status: COMPLETED | OUTPATIENT
Start: 2021-12-30 | End: 2021-12-30

## 2021-12-30 RX ADMIN — SODIUM CHLORIDE 1000 MILLILITER(S): 9 INJECTION, SOLUTION INTRAVENOUS at 16:06

## 2021-12-30 NOTE — ED PROVIDER NOTE - PATIENT PORTAL LINK FT
You can access the FollowMyHealth Patient Portal offered by [facility] by registering at the following website: [email or web address]. By joining [employer]’s FollowMyHealth portal, you will also be able to view your health information using other applications (apps) compatible with our system.

## 2021-12-30 NOTE — ED PROVIDER NOTE - CARE PROVIDER_API CALL
Juan Linn)  ColonRectal Surgery; Surgery  [address], [address] [place] [address]  Phone: [phone]  Fax: [phone]  Follow Up Time:

## 2021-12-30 NOTE — ED PROVIDER NOTE - OBJECTIVE STATEMENT
46yF no pmhx c/o rectal pain x1day; constant, exacerbated by sitting, non-radiating; states she had perianal abscess 3yrs ago, was I&D'ed and since then it had an opening that continually drained, and that any time she felt increased pressure she would be able to squeeze and express some discharge, until a few months ago when opening closed and had not had any drainage since then. Denies fever/chills, chest pain, SOB, abd pain, n/v/d.    Colorectal: Dr Medrano

## 2021-12-30 NOTE — ED PROVIDER NOTE - PHYSICAL EXAMINATION
Vital Signs: Reviewed  GEN: alert, NAD, speaks full sentences  HEAD:  normocephalic, atraumatic  EYES:  PERRLA; conjunctivae without injection, drainage or discharge  ENMT:  nasal mucosa moist; mouth moist without ulcerations or lesions; throat moist without erythema, exudate, ulcerations or lesions  NECK:  supple  CARDIAC:  regular rate, normal S1 and S2, no murmurs  RESP:  respiratory rate and effort appear normal for age; lungs are clear to auscultation bilaterally; no rales or wheezes  ABDOMEN:  soft, nontender, nondistended; area of fluctuance perianally approx 7oclock       chaperone: BITA Ramos  MUSCULOSKELETAL/NEURO:  normal movement, normal tone  SKIN:  normal skin color for age and race, well-perfused; warm and dry

## 2021-12-30 NOTE — ED PROVIDER NOTE - ATTENDING CONTRIBUTION TO CARE
45 yo f with pmh of perianal abscess i&d 3 yrs ago by dr. llanes, presents with c/o anal pain.  pt says she felt some fullness and pain on sitting.  no pain on BM.  no fever, no chills.  has some mild weakness.  no cp ,no sob, no abd pain.  exam: nad, ncat, perrl, eomi, mmm, rrr, ctab, abd soft, nt nd, mild fluctuance at 7'oclock of anus imp: pt with abscess, r/o perirectal v. perianal

## 2021-12-30 NOTE — ED PROVIDER NOTE - NS ED ROS FT
Review of Systems:  	•	CONSTITUTIONAL - no fever, no diaphoresis  	•	SKIN - no rash, no lesions  	•	HEMATOLOGIC - no bleeding, no bruising  	•	EYES - no discharge, no injection  	•	ENT - no sore throat, no runny nose  	•	RESPIRATORY - no shortness of breath, no cough  	•	CARDIAC - no chest pain, no palpitations  	•	GI - no abd pain, no nausea, no vomiting, no diarrhea, +rectal pain  	•	GENITO-URINARY - no dysuria, no hematuria  	•	MUSCULOSKELETAL - no joint pain, no muscle aches  	•	NEUROLOGIC - no dizziness, no headache

## 2021-12-30 NOTE — ED ADULT TRIAGE NOTE - ARRIVAL FROM
2/5/2018  Spoke with nursing this afternoon. Pt continues to have a very difficult time with her breathing and not appropriate to participate in OT treatment this afternoon. Will re-attempt when pt is appropriate.   Thank you,  Regina Fink, OT Home

## 2021-12-30 NOTE — ED PROVIDER NOTE - CLINICAL SUMMARY MEDICAL DECISION MAKING FREE TEXT BOX
Pt with perianal pain, possible sinus tract development, no active abscess to i&d but will start on augmentin

## 2021-12-30 NOTE — ED PROVIDER NOTE - NSFOLLOWUPINSTRUCTIONS_ED_ALL_ED_FT
Rectal Pain    WHAT YOU NEED TO KNOW:    Rectal pain can be caused by a number of conditions, such as hemorrhoids, an abscess, trauma, or anal tear. Infection, muscle spasms, or anal intercourse can also cause rectal pain.     DISCHARGE INSTRUCTIONS:    Medicines: You may need any of the following:     NSAIDs, such as ibuprofen, help decrease swelling, pain, and fever. This medicine is available with or without a doctor's order. NSAIDs can cause stomach bleeding or kidney problems in certain people. If you take blood thinner medicine, always ask your healthcare provider if NSAIDs are safe for you. Always read the medicine label and follow directions.      Prescription pain medicine may be given. Ask how to take this medicine safely.      Antibiotics help treat or prevent a bacterial infection.      Bowel movement softeners help soften your bowel movement. They help prevent straining and more damage to the area.      Take your medicine as directed. Contact your healthcare provider if you think your medicine is not helping or if you have side effects. Tell him or her if you are allergic to any medicine. Keep a list of the medicines, vitamins, and herbs you take. Include the amounts, and when and why you take them. Bring the list or the pill bottles to follow-up visits. Carry your medicine list with you in case of an emergency.    Return to the emergency department if:     You have severe pain.        Contact your healthcare provider if:     Your pain does not decrease after 1 to 2 days of treatment.      You cannot take the medicine prescribed for your condition.      You have questions or concerns about your condition or care.    Take a sitz bath: Fill a bathtub with 4 to 6 inches of warm water. You may also use a sitz bath pan that fits over a toilet. Sit in the sitz bath for 20 minutes. Do this 2 to 3 times a day, or as directed. The warm water can help decrease pain, muscle spasms, or swelling.     Apply heat: Apply a warm, moist compress on your anus for 20 to 30 minutes every 2 hours for as many days as directed. Heat helps decrease pain and muscle spasms.    Eat high-fiber foods: This will help prevent constipation and soften your bowel movements. High-fiber foods include fruit, vegetables, whole-grain breads and cereals, and beans. A dietitian or healthcare provider can help you create a high-fiber meal plan.     Drink liquids as directed: You may need to drink more liquid than usual to help soften your bowel movements. Ask how much liquid to drink each day and which liquids are best for you.     Follow up with your healthcare provider as directed: Write down your questions so you remember to ask them during your visits.

## 2021-12-31 ENCOUNTER — EMERGENCY (EMERGENCY)
Facility: HOSPITAL | Age: 46
LOS: 0 days | Discharge: HOME | End: 2021-12-31
Attending: EMERGENCY MEDICINE | Admitting: EMERGENCY MEDICINE
Payer: COMMERCIAL

## 2021-12-31 VITALS
OXYGEN SATURATION: 96 % | TEMPERATURE: 99 F | HEIGHT: 65 IN | HEART RATE: 94 BPM | SYSTOLIC BLOOD PRESSURE: 155 MMHG | DIASTOLIC BLOOD PRESSURE: 80 MMHG | RESPIRATION RATE: 18 BRPM

## 2021-12-31 DIAGNOSIS — I10 ESSENTIAL (PRIMARY) HYPERTENSION: ICD-10-CM

## 2021-12-31 DIAGNOSIS — K61.0 ANAL ABSCESS: ICD-10-CM

## 2021-12-31 DIAGNOSIS — K62.89 OTHER SPECIFIED DISEASES OF ANUS AND RECTUM: ICD-10-CM

## 2021-12-31 DIAGNOSIS — K60.3 ANAL FISTULA: ICD-10-CM

## 2021-12-31 DIAGNOSIS — E03.9 HYPOTHYROIDISM, UNSPECIFIED: ICD-10-CM

## 2021-12-31 PROCEDURE — 99284 EMERGENCY DEPT VISIT MOD MDM: CPT

## 2021-12-31 PROCEDURE — 99212 OFFICE O/P EST SF 10 MIN: CPT | Mod: GC

## 2021-12-31 PROCEDURE — 93010 ELECTROCARDIOGRAM REPORT: CPT

## 2021-12-31 RX ADMIN — Medication 1 TABLET(S): at 15:03

## 2021-12-31 NOTE — ED PROVIDER NOTE - NS ED ROS FT
Constitutional: (-) fever  Eyes/ENT: (-) blurry vision, (-) epistaxis  Cardiovascular: (-) chest pain, (-) syncope  Respiratory: (-) cough, (-) shortness of breath  Gastrointestinal: (-) vomiting, (-) diarrhea (+) rectal pain  Musculoskeletal: (-) neck pain, (-) back pain, (-) joint pain  Integumentary: (-) rash, (-) edema  Neurological: (-) headache, (-) altered mental status  Psychiatric: (-) hallucinations  Allergic/Immunologic: (-) pruritus

## 2021-12-31 NOTE — ED PROVIDER NOTE - CARE PROVIDERS DIRECT ADDRESSES
[email or web address]

## 2021-12-31 NOTE — ED PROVIDER NOTE - ATTENDING CONTRIBUTION TO CARE
47 yo f with pmh of perianal abscess i&d 3 yrs ago by dr. llanes, called back for concerning CT.  pt says she fullness in the perianal area has improved.  no pain on BM.  no fever, no chills.  has some mild weakness.  no cp ,no sob, no abd pain. 47 yo f with pmh of perianal abscess i&d 3 yrs ago by dr. llanes, called back for concerning CT.  pt says she fullness in the perianal area has improved.  no pain on BM.  no fever, no chills.  no cp ,no sob, no abd pain.  pt feels overall better.  as per pa who called pt back, was a misread of the results that was already given to pt yesterday.  however, since pt is here again, will consult surgery.  exam: nad, ncat, perrl, eomi, mmm, rrr, ctab, abd soft, imp: pt with perianal issue, will consult surgery

## 2021-12-31 NOTE — ED PROVIDER NOTE - CARE PROVIDER_API CALL
Ephraim Castle)  Surgery; Surgical Critical Care  [address] [place] [address], [address] [place] [address]  Phone: [phone]  Fax: [phone]  Follow Up Time: 1-3 Days    Juan Linn)  ColonRectal Surgery; Surgery  [address] [facility], [address] [place] [address]  Phone: [phone]  Fax: [phone]  Follow Up Time: 1-3 Days

## 2021-12-31 NOTE — ED PROVIDER NOTE - PROVIDER TOKENS
PROVIDER:[TOKEN:[[id number]],FOLLOWUP:[1-3 Days]],PROVIDER:[TOKEN:[[id number]],FOLLOWUP:[1-3 Days]]

## 2021-12-31 NOTE — CONSULT NOTE ADULT - ASSESSMENT
ASSESSMENT:  46F w/ PMH of hypothyroidism, HTN, and perirectal abscess s/p I&D (Dr. Rossi, 3/12/19, w/ findings of extrasphincteric fistula) who presented with a few days of rectal discomfort, now likely with perianal fistula. Physical exam findings, imaging, and labs as documented above.     PLAN:  - no acute surgical intervention; no acute abscess to be drained  - pt likely has a perianal fistula that may require fistulotomy vs. fistulectomy (outpatient)  - outpatient FU with Dr. Castle or Dr. Linn to discuss elective surgery    Above plan discussed with Attending Surgeon Dr. Castle, patient, and Primary team  12-31-21 @ 14:20

## 2021-12-31 NOTE — ED POST DISCHARGE NOTE - DETAILS
CT results consistent with [id number] perianal abscess. patient aware and will return to ED for eval/management.

## 2021-12-31 NOTE — ED PROVIDER NOTE - CLINICAL SUMMARY MEDICAL DECISION MAKING FREE TEXT BOX
Pt with perianal abscess/sinus tract, was called back in error, already improving on oral abx, was seen and cleared by surgery, pt to f/u with dr. strickland outpt

## 2021-12-31 NOTE — ED PROVIDER NOTE - OBJECTIVE STATEMENT
Pt is  46yF no pmhx c/o rectal pain x1day; constant, exacerbated by sitting, non-radiating; states she had perianal abscess 3yrs ago, was I&D'ed and since then it had an opening that continually drained, and that any time she felt increased pressure she would be able to squeeze and express some discharge, until a few months ago when opening closed and had not had any drainage since then. Denies fever/chills, chest pain, SOB, abd pain, n/v/d. Pt seen in ED yesterday given Augmentin and surgery follow up, pt called in for CT scan of possible [id number] abscess

## 2021-12-31 NOTE — CONSULT NOTE ADULT - SUBJECTIVE AND OBJECTIVE BOX
GENERAL SURGERY CONSULT NOTE    Patient: DEREK DANIEL , 46y ([birth year])Female   MRN: [id number]  Location: [facility] ED  Visit: 12-31-21 Emergency  Date: 12-31-21 @ 14:20    HPI:  46F w/ PMH of hypothyroidism, HTN, and perirectal abscess s/p I&D (Dr. Rossi, 3/12/19, w/ findings of extrasphincteric fistula) who presented with a few days of rectal discomfort. Pt has been seen by Dr. Medrano as an outpatient to discuss fistulotomy/fistulectomy, but never had surgery. She has never had a colonoscopy. Since her initial incision and drainage with Dr. Rossi in 2019, the fistula remained open with intermittent drainage. Over the past 3-4 months, the fistula closed spontaneously. She came into the ED with a few days of pain when she sits. Denies fever, chills, changes in bowel habits, pain with defecation, hematochezia, melena. Denies abdominal pain. In the ED yesterday, WBC 7. CT A/P was performed and showed a 2.0 x 2.4 x 6 cm soft tissue structure in the left perianal region, extending through the adipose tissue of the left gluteal fold to the skin surface c/w persistent or recurrent thick walled sinus tract and/or recently drained abscess. No abscess or collection noted. Pt was recalled today with the final CT readings. She reports after starting the antibiotic (augmentin) that was prescribed yesterday, her discomfort has improved.     PAST MEDICAL & SURGICAL HISTORY:  No pertinent past medical history    Home Medications:  acetaminophen 325 mg oral tablet: 2 tab(s) orally every 6 hours, As Needed (13 Mar 2019 13:36)  ibuprofen 400 mg oral tablet: 1 tab(s) orally every 8 hours, As Needed (13 Mar 2019 13:36)    VITALS:  T(F): 98.6 (12-31-21 @ 12:08), Max: 98.6 (12-31-21 @ 12:08)  HR: 94 (12-31-21 @ 12:08) (94 - 94)  BP: 155/80 (12-31-21 @ 12:08) (155/80 - 155/80)  RR: 18 (12-31-21 @ 12:08) (18 - 18)  SpO2: 96% (12-31-21 @ 12:08) (96% - 96%)    PHYSICAL EXAM:  General: NAD, AAOx3, calm and cooperative  Abdomen: Soft, non-distended, non-tender, no rebound, no guarding. +BS.  Rectal: ~1cm palpable mass at 4'oclock position, no crepitus/fluctuance/tenderness on exam, no fissures or hemorrhoids  Skin: Warm/dry, normal color, no jaundice    LAB/STUDIES:                        14.3   7.98  )-----------( 198      ( 30 Dec 2021 15:43 )             43.0     12-30    138  |  102  |  15  ----------------------------<  87  4.8   |  20  |  0.9    Ca    9.1      30 Dec 2021 15:43    TPro  7.4  /  Alb  4.5  /  TBili  0.3  /  DBili  x   /  AST  22  /  ALT  26  /  AlkPhos  80  12-30    LIVER FUNCTIONS - ( 30 Dec 2021 15:43 )  Alb: 4.5 g/dL / Pro: 7.4 g/dL / ALK PHOS: 80 U/L / ALT: 26 U/L / AST: 22 U/L / GGT: x           IMAGING:  < from: CT Abdomen and Pelvis w/ IV Cont (12.30.21 @ 19:01) >  PERITONEUM/MESENTERY/BOWEL: There is a 2.0 x 2.4 x 6 cm soft tissue   structure arising in the left perianal region, extending through the   adipose tissue of the left gluteal fold to the skin surface. This   corresponds to the site of the thick-walled perianal abscesses noted on   the scan of 3/12/2019. While no discrete fluid collection is seen within   this structure at this time, findings are compatible with a persistent or   recurrent thick walled sinus tract and/or recently drained abscess. There   is mild stranding within the adipose tissue of the left ischiorectal   fossa.    No evidence of bowel obstruction or ascites. No pneumoperitoneum.  The   appendix is normal in appearance.    IMPRESSION:  There is a 2.0 x 2.4 x 6 cm soft tissue structure arising in the left   perianal region, extending through the adipose tissue of the left gluteal   fold to the skin surface. This corresponds to the site of the   thick-walled perianal abscesses noted on the scan of 3/12/2019. While no   discrete fluid collection is seen within this structure at this time,   findings are compatible with a persistent or recurrent thick walled sinus   tract and/or recently drained abscess.  < end of copied text >

## 2021-12-31 NOTE — ED PROVIDER NOTE - PHYSICAL EXAMINATION
Physical Exam    Vital Signs: I have reviewed the initial vital signs.  Constitutional: well-nourished, appears stated age, no acute distress  Eyes: Conjunctiva pink, Sclera clear, PERRLA, EOMI.  Cardiovascular: S1 and S2, regular rate, regular rhythm, well-perfused extremities, radial pulses equal and 2+  Respiratory: unlabored respiratory effort, clear to auscultation bilaterally no wheezing, rales and rhonchi  Gastrointestinal: soft, non-tender abdomen, no pulsatile mass, normal bowl sounds  Rectal: Pt refused repeat rectal exam, requesting surgery to perform physical exam    Musculoskeletal: supple neck, no lower extremity edema, no midline tenderness  Integumentary: warm, dry, no rash  Neurologic: awake, alert, cranial nerves II-XII grossly intact, extremities’ motor and sensory functions grossly intact  Psychiatric: appropriate mood, appropriate affect

## 2021-12-31 NOTE — ED PROVIDER NOTE - NSFOLLOWUPINSTRUCTIONS_ED_ALL_ED_FT
Follow up with your primary medical doctor in 1-2 as well as with surgery    Abscess    WHAT YOU NEED TO KNOW:    A warm compress may help your abscess drain. Your healthcare provider may make a cut in the abscess so it can drain. You may need surgery to remove an abscess that is on your hands or buttocks.     DISCHARGE INSTRUCTIONS:    Return to the emergency department if:     The area around your abscess becomes very painful, warm, or has red streaks.      You have a fever and chills.      Your heart is beating faster than usual.       You feel faint or confused.    Contact your healthcare provider if:     Your abscess gets bigger or does not get better.      Your abscess returns.      You have questions or concerns about your condition or care.    Medicines: You may need any of the following:     Antibiotics help treat a bacterial infection.       Acetaminophen decreases pain and fever. It is available without a doctor's order. Ask how much to take and how often to take it. Follow directions. Acetaminophen can cause liver damage if not taken correctly.      NSAIDs, such as ibuprofen, help decrease swelling, pain, and fever. This medicine is available with or without a doctor's order. NSAIDs can cause stomach bleeding or kidney problems in certain people. If you take blood thinner medicine, always ask your healthcare provider if NSAIDs are safe for you. Always read the medicine label and follow directions.      Take your medicine as directed. Contact your healthcare provider if you think your medicine is not helping or if you have side effects. Tell him or her if you are allergic to any medicine. Keep a list of the medicines, vitamins, and herbs you take. Include the amounts, and when and why you take them. Bring the list or the pill bottles to follow-up visits. Carry your medicine list with you in case of an emergency.    Self-care:     Apply a warm compress to your abscess. This will help it open and drain. Wet a washcloth in warm, but not hot, water. Apply the compress for 10 minutes. Repeat this 4 times each day. Do not press on an abscess or try to open it with a needle. You may push the bacteria deeper or into your blood.       Do not share your clothes, towels, or sheets with anyone. This can spread the infection to others.       Wash your hands often. This can help prevent the spread of germs. Use soap and water or an alcohol-based hand rub.     Care for your wound after it is drained:     Care for your wound as directed. If your healthcare provider says it is okay, carefully remove the bandage and gauze packing. You may need to soak the gauze to get it out of your wound. Clean your wound and the area around it as directed. Dry the area and put on new, clean bandages. Change your bandages when they get wet or dirty.       Ask your healthcare provider how to change the gauze in your wound. Keep track of how many pieces of gauze are placed inside the wound. Do not put too much packing in the wound. Do not pack the gauze too tightly in your wound.     Follow up with your healthcare provider in 1 to 3 days: You may need to have your packing removed or your bandage changed. Write down your questions so you remember to ask them during your visits.        © Copyright [employer] 2019 All illustrations and images included in CareNotes are the copyrighted property of [employer] or [employer].

## 2021-12-31 NOTE — ED PROVIDER NOTE - PATIENT PORTAL LINK FT
You can access the FollowMyHealth Patient Portal offered by [facility] by registering at the following website: [email or web address]. By joining [employer]’s FollowMyHealth portal, you will also be able to view your health information using other applications (apps) compatible with our system.

## 2021-12-31 NOTE — CONSULT NOTE ADULT - ATTENDING COMMENTS
chronic anal fistula   afebrile   no collection   pain control   f/u as outpatient to schedule surgery

## 2022-04-12 NOTE — ED ADULT TRIAGE NOTE - ARRIVAL FROM
[facility] Palliative Medicine Progress Note  Reason for visit: f/u GOC    Writer met with patient at bedside. He remains confused and severely [place], Pt notes patient ambulates without pain, OT recommending 24 hour care.    Meds  Medications reviewed and notable for:  • hydrALAZINE  50 mg Oral 3 times per day   • sodium chloride  500 mL Intravenous Once   • ciprofloxacin  250 mg Oral BID Abreakfast & HS   • NIFEdipine XL  90 mg Oral Daily   • metoPROLOL succinate  100 mg Oral Daily   • pantoprazole  40 mg Oral QAM AC   • sodium chloride (PF)  2 mL Intracatheter 2 times per day   • enoxaparin  30 mg Subcutaneous Daily   • Magnesium Standard Replacement Protocol   Does not apply See Admin Instructions   • LORazepam  2 mg Oral QHS   • divalproex  500 mg Oral Nightly   • divalproex  250 mg Oral Daily   • aspirin  81 mg Oral Daily     PRN medications: none    Obj  Temp:  [97.4 °F (36.3 °C)-98.4 °F (36.9 °C)] 97.9 °F (36.6 °C)  Heart Rate:  [64-68] 65  Resp:  [15-20] 18  BP: ()/(38-58) 115/53  LAST BM: 1 (04/11/22 1042)    GEN: NAD, frail elder  MS: Alert to self only  EYES: no icterus or conjunctivitis  PUL: unlabored, room air  COR: RRR S1S2  ABD: soft, non distended, non tender  : chronic white  MSK: no masses or deformities  SKIN: warm, dry, thin  NEURO: no obvious focal deficits, able to move all 4 extremities    Recent Labs   Lab 04/12/22 0456 04/11/22  0443 04/10/22  0533   SODIUM 139 137 132*   POTASSIUM 3.4 3.4 3.4   BUN 57* 47* 30*   CREATININE 2.27* 1.71* 1.11   ALBUMIN 2.6* 2.8* 2.9*   AST 33 46* 17   GPT 18 20 14   ALKPT 45 42* 48   BILIRUBIN 0.6 0.7 0.9     Recent Labs   Lab 04/12/22 0456 04/11/22  0443 04/10/22  0533   WBC 5.3 6.1 5.9   HGB 8.5* 9.6* 10.7*   * 143 163       Imaging  Reviewed and notable for  Reviewed in EPIC and notable for:        CT HEAD WO CONTRAST   Final Result   IMPRESSION:        No acute intracranial abnormality.       XR CHEST AP OR PA - PORTABLE   Final Result    IMPRESSION:        Cardiomegaly with bilateral central vascular prominence and presumed small   bilateral pleural effusions. Overall findings suggest fluid overload.       There is a presumed prominent loop of air-filled bowel underneath the right   hemidiaphragm. Correlate with clinical exam. If there is any concern for   free air, CT abdomen and pelvis recommended.                        DIAGNOSTIC STUDIES    CT HEAD WO CONTRAST   Final Result   IMPRESSION:        No acute intracranial abnormality.       XR CHEST AP OR PA - PORTABLE   Final Result   IMPRESSION:        Cardiomegaly with bilateral central vascular prominence and presumed small   bilateral pleural effusions. Overall findings suggest fluid overload.       There is a presumed prominent loop of air-filled bowel underneath the right   hemidiaphragm. Correlate with clinical exam. If there is any concern for   free air, CT abdomen and pelvis recommended.                        COMPREHENSIVE ASSESSMENT     MEDICAL  · Pertinent Diagnoses:      Altered mental status/toxic metabolic encephalopathy due to UTI   Acute UTI in a patient with chronic indwelling Cedeño catheter  Chest pain  Essential hypertension , blood pressure markedly elevated  Coronary artery disease with history of stents   Chronic systolic congestive Heart failure  Hyperlipidemia, unspecified   History of [employer] biventricular ICD, downgraded to biventricular pacemaker in 2016  Migraine without aura  Restless leg syndrome   Anxiety disorder   Mild dementia  Chronic stage 3 kidney disease  · Pertinent Symptoms: AMS  · Co-morbidities:  As above  · Functional status at baseline: Palliative Performance Scale:  30 (bedbound, unable to do any activity, extensive disease. Needs total assistance. Reduced intake. Fully conscious, drowsy, or confused.)  · Prognosis: poor, advanced age, falls, recurrent UTI. Basis for estimate: clinical assessment, medical team consensus and CHART  REVIEW         Discussion:    Writer met with patient at bedside. He remains confused and [place], pleasant, NAD. Spoke with patients' wife, Zahra. Zahra wants patient to be DC'd home with HH to include RN, CARMELO, SW, PT/OT    8205- Writer spoke with patient's wife again with therapy and [facility] care recommendations for Hospice based on previous support.. Patient's wife states, \"I just get really nervous when I hear the word hospice.\" Writer used this time to provide additional education regarding the difference beetween choosing hospice services versus a rehab focus. Wife feels it will be a good choice as she doesn't want patient to undergo any additional invasive procedures and or therapies and wants to focus her time on quality with the support of her children. She has chosen straight DNR. Writer will leave [place] state DNR at bedside for her to sign as she explained she will visit when her daughter is able to bring her, which will be in the evening tomorrow 4/12/22.    Patient is homebound.    Plan/Recommendations    1. As above    2. No symptom management needs at this assessment    3. GOC are clear    Total time today was 30 minutes, majority spent counseling and coordinating care.    Maya Khan MSN, RN, APNP, AGPCNP-C, [facility]  Palliative Care Nurse Practitioner  Pager: [phone]  (M-F [id number]-5P)  After 5 pm please call the answering service [phone]  Weekends please call NP on call pager [phone]  [facility] for free confidential spiritual and emotional   Support [phone]             Home

## 2022-10-14 ENCOUNTER — OUTPATIENT (OUTPATIENT)
Dept: OUTPATIENT SERVICES | Facility: HOSPITAL | Age: 47
LOS: 1 days | Discharge: HOME | End: 2022-10-14

## 2022-10-14 DIAGNOSIS — M25.569 PAIN IN UNSPECIFIED KNEE: ICD-10-CM

## 2022-10-14 DIAGNOSIS — M54.59 OTHER LOW BACK PAIN: ICD-10-CM

## 2022-10-14 PROCEDURE — 72110 X-RAY EXAM L-2 SPINE 4/>VWS: CPT | Mod: 26

## 2022-10-14 PROCEDURE — 73562 X-RAY EXAM OF KNEE 3: CPT | Mod: 26,RT

## 2022-10-14 PROCEDURE — 73502 X-RAY EXAM HIP UNI 2-3 VIEWS: CPT | Mod: 26,RT

## 2022-10-15 PROBLEM — Z78.9 OTHER SPECIFIED HEALTH STATUS: Chronic | Status: ACTIVE | Noted: 2021-12-30

## 2022-10-19 ENCOUNTER — TRANSCRIPTION ENCOUNTER (OUTPATIENT)
Age: 47
End: 2022-10-19

## 2022-10-28 ENCOUNTER — APPOINTMENT (OUTPATIENT)
Dept: ORTHOPEDIC SURGERY | Facility: CLINIC | Age: 47
End: 2022-10-28

## 2022-10-28 VITALS
SYSTOLIC BLOOD PRESSURE: 139 MMHG | HEART RATE: 68 BPM | OXYGEN SATURATION: 97 % | HEIGHT: 64.51 IN | WEIGHT: 220 LBS | DIASTOLIC BLOOD PRESSURE: 100 MMHG | BODY MASS INDEX: 37.1 KG/M2

## 2022-10-28 DIAGNOSIS — M16.11 UNILATERAL PRIMARY OSTEOARTHRITIS, RIGHT HIP: ICD-10-CM

## 2022-10-28 DIAGNOSIS — M25.351 OTHER INSTABILITY, RIGHT HIP: ICD-10-CM

## 2022-10-28 DIAGNOSIS — Z78.9 OTHER SPECIFIED HEALTH STATUS: ICD-10-CM

## 2022-10-28 DIAGNOSIS — N95.9 UNSPECIFIED MENOPAUSAL AND PERIMENOPAUSAL DISORDER: ICD-10-CM

## 2022-10-28 PROCEDURE — 99204 OFFICE O/P NEW MOD 45 MIN: CPT

## 2022-10-28 RX ORDER — LEVOTHYROXINE SODIUM 0.05 MG/1
50 TABLET ORAL DAILY
Qty: 90 | Refills: 3 | Status: DISCONTINUED | COMMUNITY
Start: 2020-02-01 | End: 2022-10-28

## 2022-12-14 ENCOUNTER — APPOINTMENT (OUTPATIENT)
Dept: SURGERY | Facility: CLINIC | Age: 47
End: 2022-12-14

## 2022-12-14 VITALS
HEART RATE: 100 BPM | HEIGHT: 64.5 IN | SYSTOLIC BLOOD PRESSURE: 124 MMHG | DIASTOLIC BLOOD PRESSURE: 64 MMHG | BODY MASS INDEX: 37.63 KG/M2 | WEIGHT: 223.13 LBS | TEMPERATURE: 97.1 F | OXYGEN SATURATION: 98 %

## 2022-12-14 PROCEDURE — 46600 DIAGNOSTIC ANOSCOPY SPX: CPT

## 2022-12-15 NOTE — HISTORY OF PRESENT ILLNESS
[FreeTextEntry1] : Pt being seen for initial evaluation for fistula. Pt is a previous Pt of Dr Medrano. Pt has no PMH and PSH of 2 c-sections. Pt has never had a colonoscopy. Pt reports a 5+ year history of perianal abscess. She reports about every 2 weeks the area swell and drains. she has had to be admitted 1 time to the hospital for drainage. Most recently had a Ct scan in December which showed a draining abscess Pt denies fever, chills, nausea, vomiting, abdominal pain, constipation, diarrhea, blood in stool, or unexpected weight loss. Pt denies a family history of colon cancer, rectal cancer, or inflammatory bowel disease.\par \par Ct 12/30/21 IMPRESSION:\par \par There is a 2.0 x 2.4 x 6 cm soft tissue structure arising in the left perianal region, extending through the adipose tissue of the left gluteal fold to the skin surface. This corresponds to the site of the thick-walled perianal abscesses noted on the scan of 3/12/2019. While no discrete fluid collection is seen within this structure at this time, findings are compatible with a persistent or recurrent thick walled sinus tract and/or recently drained abscess.

## 2022-12-15 NOTE — PHYSICAL EXAM
[Abdomen Masses] : No abdominal masses [Abdomen Tenderness] : ~T No ~M abdominal tenderness [No HSM] : no hepatosplenomegaly [Excoriation] : no perianal excoriation [Fistula] : a fistula [Wart] : no warts [Ulcer ___ cm] : no ulcers [Pilonidal Cyst] : no pilonidal cysts [Tender, Swollen] : nontender, non-swollen [Thrombosed] : that was not thrombosed [Skin Tags] : there were no residual hemorrhoidal skin tags seen [Normal] : was normal [None] : there was no rectal mass  [Respiratory Effort] : normal respiratory effort [Normal Rate and Rhythm] : normal rate and rhythm [de-identified] : external exam shows left lateral external opening. No erythema induration or purulence [de-identified] : awake, alert and in no acute distress

## 2022-12-15 NOTE — PROCEDURE
[FreeTextEntry1] : BARBARA and anoscopy show normal sphincter tone, normal internal hemorrhoids and no masses.\par

## 2022-12-15 NOTE — ASSESSMENT
[FreeTextEntry1] : 47F with long standing anal fistula\par \par I discussed the pathology of anal fistula with the patient.  We will obtain an MRI and she will return after her images to schedule surgery.

## 2022-12-16 ENCOUNTER — NON-APPOINTMENT (OUTPATIENT)
Age: 47
End: 2022-12-16

## 2022-12-22 ENCOUNTER — RESULT REVIEW (OUTPATIENT)
Age: 47
End: 2022-12-22

## 2022-12-22 ENCOUNTER — OUTPATIENT (OUTPATIENT)
Dept: OUTPATIENT SERVICES | Facility: HOSPITAL | Age: 47
LOS: 1 days | Discharge: HOME | End: 2022-12-22

## 2022-12-22 DIAGNOSIS — K60.3 ANAL FISTULA: ICD-10-CM

## 2022-12-22 PROCEDURE — 72197 MRI PELVIS W/O & W/DYE: CPT | Mod: 26

## 2023-01-03 ENCOUNTER — NON-APPOINTMENT (OUTPATIENT)
Age: 48
End: 2023-01-03

## 2023-01-10 ENCOUNTER — APPOINTMENT (OUTPATIENT)
Dept: SURGERY | Facility: CLINIC | Age: 48
End: 2023-01-10
Payer: COMMERCIAL

## 2023-01-10 VITALS
WEIGHT: 223 LBS | BODY MASS INDEX: 37.61 KG/M2 | HEIGHT: 64.5 IN | SYSTOLIC BLOOD PRESSURE: 122 MMHG | DIASTOLIC BLOOD PRESSURE: 80 MMHG | HEART RATE: 86 BPM | TEMPERATURE: 97 F | OXYGEN SATURATION: 97 %

## 2023-01-10 DIAGNOSIS — K60.3 ANAL FISTULA: ICD-10-CM

## 2023-01-10 PROCEDURE — 99213 OFFICE O/P EST LOW 20 MIN: CPT

## 2023-01-11 ENCOUNTER — OUTPATIENT (OUTPATIENT)
Dept: OUTPATIENT SERVICES | Facility: HOSPITAL | Age: 48
LOS: 1 days | Discharge: HOME | End: 2023-01-11
Payer: COMMERCIAL

## 2023-01-11 DIAGNOSIS — N92.0 EXCESSIVE AND FREQUENT MENSTRUATION WITH REGULAR CYCLE: ICD-10-CM

## 2023-01-11 PROCEDURE — 76856 US EXAM PELVIC COMPLETE: CPT | Mod: 26

## 2023-01-11 PROCEDURE — 76830 TRANSVAGINAL US NON-OB: CPT | Mod: 26

## 2023-01-16 PROBLEM — K60.3 FISTULA-IN-ANO: Status: ACTIVE | Noted: 2019-03-17

## 2023-01-16 NOTE — ASSESSMENT
[FreeTextEntry1] : 47F with long standing anal fistula\par \par I discussed surgical management of anal fistula with the patient.  She would like to hold off for now.  She will follow up with Ortho and GYN for her MRI findings. She will return as needed.\par

## 2023-01-16 NOTE — HISTORY OF PRESENT ILLNESS
[FreeTextEntry1] : Pt being seen for follow up evaluation for fistula. Pt is a previous Pt of Dr Medrano. Pt has no PMH and PSH of 2 c-sections. Pt has never had a colonoscopy. Pt reports a 5+ year history of perianal abscess. She reports about every 2 weeks the area swell and drains. she has had to be admitted 1 time to the hospital for drainage. Had a Ct scan in December which showed a draining abscess.  PT presents today reporting symptoms have resolved at this time. denies pain, bleeding or drainage.  Pt denies fever, chills, nausea, vomiting, abdominal pain, constipation, diarrhea, blood in stool, or unexpected weight loss. Pt denies a family history of colon cancer, rectal cancer, or inflammatory bowel disease.\par \par MRI completed and showed left-sided transsphincteric perianal tract, no abscess noted. addition findings were discussed with patient and patient is f/u appropriately.\par \par Ct 12/30/21 IMPRESSION:\par \par There is a 2.0 x 2.4 x 6 cm soft tissue structure arising in the left perianal region, extending through the adipose tissue of the left gluteal fold to the skin surface. This corresponds to the site of the thick-walled perianal abscesses noted on the scan of 3/12/2019. While no discrete fluid collection is seen within this structure at this time, findings are compatible with a persistent or recurrent thick walled sinus tract and/or recently drained abscess.

## 2023-01-16 NOTE — PHYSICAL EXAM
[Abdomen Masses] : No abdominal masses [Abdomen Tenderness] : ~T No ~M abdominal tenderness [No HSM] : no hepatosplenomegaly [Excoriation] : no perianal excoriation [Fistula] : a fistula [Wart] : no warts [Ulcer ___ cm] : no ulcers [Pilonidal Cyst] : no pilonidal cysts [Tender, Swollen] : nontender, non-swollen [Thrombosed] : that was not thrombosed [Skin Tags] : there were no residual hemorrhoidal skin tags seen [Normal] : was normal [None] : there was no rectal mass  [Respiratory Effort] : normal respiratory effort [Normal Rate and Rhythm] : normal rate and rhythm [de-identified] : external exam shows left lateral external opening. No erythema induration or purulence [de-identified] : awake, alert and in no acute distress

## 2024-06-05 ENCOUNTER — APPOINTMENT (OUTPATIENT)
Dept: ORTHOPEDIC SURGERY | Facility: CLINIC | Age: 49
End: 2024-06-05
Payer: COMMERCIAL

## 2024-06-05 VITALS — HEIGHT: 65 IN | WEIGHT: 215 LBS | BODY MASS INDEX: 35.82 KG/M2

## 2024-06-05 DIAGNOSIS — M16.31 UNILATERAL OSTEOARTHRITIS RESULTING FROM HIP DYSPLASIA, RIGHT HIP: ICD-10-CM

## 2024-06-05 DIAGNOSIS — Q65.89 OTHER SPECIFIED CONGENITAL DEFORMITIES OF HIP: ICD-10-CM

## 2024-06-05 PROCEDURE — 99213 OFFICE O/P EST LOW 20 MIN: CPT

## 2024-06-05 PROCEDURE — 73564 X-RAY EXAM KNEE 4 OR MORE: CPT | Mod: RT

## 2024-06-05 PROCEDURE — 73522 X-RAY EXAM HIPS BI 3-4 VIEWS: CPT

## 2024-06-05 RX ORDER — MELOXICAM 15 MG/1
15 TABLET ORAL DAILY
Qty: 30 | Refills: 1 | Status: ACTIVE | COMMUNITY
Start: 2024-06-05 | End: 1900-01-01

## 2024-06-08 NOTE — DISCUSSION/SUMMARY
[de-identified] : All these findings were discussed with Ms. Jacobs.  At this point she is not emotionally ready for total hip arthroplasty.  She understands that it is difficult to predict speed of progression of osteoarthritis.  She understands high likelihood of need for total hip arthroplasty in the future.  Details of total hip arthroplasty and recovery from the surgery were discussed with the patient. We also reviewed nonoperative options of treatment.  Patient will start a course of meloxicam.  She is planning to travel, so if she does not experience significant improvement with NSAIDs, she will consider corticosteroid injection into the right hip joint.  She may get it with pain management or interventional radiology specialist.  Appropriate prescriptions were given, and patient will follow-up with me after her travel.

## 2024-06-08 NOTE — PHYSICAL EXAM
[de-identified] : On physical exam he certainly walks without significant limp.  Left hip demonstrates full range of motion with minimal pain on impingement maneuver.  She also has been significant discomfort with resisted straight leg raise. Right hip demonstrates decreased range of motion compared to the left side.  Flexion and abduction limited.  She has significant pain on impingement over and resisted straight leg raise. Limb length discrepancy is minimal within 2 to 3 mm.  Right lower extremity appears to be slightly shorter.  Bilateral extremities are neurovascular intact. [de-identified] :  bilateral hip xrays were obtained in office today show: Mild to moderate osteoarthritic changes of the left hip in setting of hip dysplasia.  There is notable undercoverage of femoral head. Right hip demonstrates moderate to severe osteoarthritis resulting from hip dysplasia. There is notable undercoverage of femoral head.  There is significant progression of osteoarthritis compared to previous x-rays from 2022.  There is loss of joint space, osteophyte formation, and subchondral cyst formation.

## 2024-06-08 NOTE — HISTORY OF PRESENT ILLNESS
[Constant] : ~He/She~ states the symptoms seem to be constant [Walking] : worsened by walking [Recumbency] : relieved by recumbency [Rest] : relieved by rest [de-identified] : 48-year-old lady presents for evaluation of pain in her right hip radiating down the knee and leg.  She also has intermittent back pain.  Pain in the hip is constant and dull, it gets worse with walking, stairs, carrying heavy objects.  Exercise, rest and extending the leg makes it better.  Pain is relatively mild.  Patient can walk 6-10 blocks without any assistive devices.  She is unable to use palpitation but has no problem putting on shoes and socks.  There is no pain with sitting.  She has pain going up the stairs but still can do it fairly easily.  She was previously seen by doctor Jeni, and diagnosed with hip dysplasia. [de-identified] : carrying and stairs [de-identified] : walking

## 2024-07-13 ENCOUNTER — NON-APPOINTMENT (OUTPATIENT)
Age: 49
End: 2024-07-13

## 2024-07-20 ENCOUNTER — NON-APPOINTMENT (OUTPATIENT)
Age: 49
End: 2024-07-20

## 2024-07-24 ENCOUNTER — RX RENEWAL (OUTPATIENT)
Age: 49
End: 2024-07-24

## 2024-10-15 ENCOUNTER — RX RENEWAL (OUTPATIENT)
Age: 49
End: 2024-10-15

## 2025-04-09 NOTE — PACU DISCHARGE NOTE - NSPTMEETSDISCHCRITERIADT_GEN_A_CORE
Presented to the ED for evaluation of worsening abdominal pain with associated nausea  which did not alleviate with OTC treatments.  Denies any recent concerns for infection, fever, chills, urinary symptoms.  She reports a prior history of gynecologic surgeries.     ED course notable for hemodynamically stable vitals, labs with WBC 12.79.  CT abdomen pelvis w/ IV contrast(read pending) were ordered.  Evaluation noted concerns for closed loop bowel obstruction and patient also had episodes of vomiting.  General surgery was consulted with recommendations for Xarelto reversal and anticipated surgery.  Patient was admitted to Hospital Medicine for further evaluation.    PLAN:  - General Surgery Consulted, follow-up recs  - NPO, IVF resuscitation  - Follow up pending radiology results    4/9/25: s/p Diagnostic laparoscopy and Robotic small bowel resection with intracorporeal anastomosis 4/8/25 per Dr. Kaur.   Pt tolerated surgery well. No flatus yet. +bowel sounds. NPO/NGT in place. C/o of right eye discomfort after surgery- resolved with lubricating eye drops.   Will need to restart anticoagulation when ok with general surgery     Presented to the ED for evaluation of worsening abdominal pain with associated nausea  which did not alleviate with OTC treatments.  Denies any recent concerns for infection, fever, chills, urinary symptoms.  She reports a prior history of gynecologic surgeries.     ED course notable for hemodynamically stable vitals, labs with WBC 12.79.  CT abdomen pelvis w/ IV contrast(read pending) were ordered.  Evaluation noted concerns for closed loop bowel obstruction and patient also had episodes of vomiting.  General surgery was consulted with recommendations for Xarelto reversal and anticipated surgery.  Patient was admitted to Hospital Medicine for further evaluation.    PLAN:  - General Surgery Consulted, follow-up recs  - NPO, IVF resuscitation  - Follow up pending radiology results    4/9/25:  +leukocytosis, cont IV Zosyn for now. +mild nausea this am, NGT in place- scant gastric output, abdomen soft, non distended. +Bowel sounds, no flatus yet       12-Mar-2019 20:03

## 2025-06-05 ENCOUNTER — APPOINTMENT (OUTPATIENT)
Facility: CLINIC | Age: 50
End: 2025-06-05
Payer: COMMERCIAL

## 2025-06-05 DIAGNOSIS — M16.11 UNILATERAL PRIMARY OSTEOARTHRITIS, RIGHT HIP: ICD-10-CM

## 2025-06-05 PROCEDURE — 73502 X-RAY EXAM HIP UNI 2-3 VIEWS: CPT

## 2025-06-05 PROCEDURE — 99203 OFFICE O/P NEW LOW 30 MIN: CPT

## 2025-06-23 ENCOUNTER — NON-APPOINTMENT (OUTPATIENT)
Age: 50
End: 2025-06-23

## 2025-07-22 ENCOUNTER — RESULT REVIEW (OUTPATIENT)
Age: 50
End: 2025-07-22

## 2025-07-22 ENCOUNTER — OUTPATIENT (OUTPATIENT)
Dept: OUTPATIENT SERVICES | Facility: HOSPITAL | Age: 50
LOS: 1 days | End: 2025-07-22
Payer: COMMERCIAL

## 2025-07-22 VITALS
OXYGEN SATURATION: 95 % | DIASTOLIC BLOOD PRESSURE: 84 MMHG | RESPIRATION RATE: 16 BRPM | TEMPERATURE: 98 F | WEIGHT: 232.59 LBS | SYSTOLIC BLOOD PRESSURE: 125 MMHG | HEART RATE: 67 BPM | HEIGHT: 65 IN

## 2025-07-22 DIAGNOSIS — M16.11 UNILATERAL PRIMARY OSTEOARTHRITIS, RIGHT HIP: ICD-10-CM

## 2025-07-22 DIAGNOSIS — Z01.818 ENCOUNTER FOR OTHER PREPROCEDURAL EXAMINATION: ICD-10-CM

## 2025-07-22 DIAGNOSIS — Z98.891 HISTORY OF UTERINE SCAR FROM PREVIOUS SURGERY: Chronic | ICD-10-CM

## 2025-07-22 PROBLEM — Z78.9 OTHER SPECIFIED HEALTH STATUS: Chronic | Status: INACTIVE | Noted: 2021-12-30 | Resolved: 2025-07-22

## 2025-07-22 LAB
A1C WITH ESTIMATED AVERAGE GLUCOSE RESULT: 5.9 % — HIGH (ref 4–5.6)
ALBUMIN SERPL ELPH-MCNC: 4.4 G/DL — SIGNIFICANT CHANGE UP (ref 3.5–5.2)
ALP SERPL-CCNC: 85 U/L — SIGNIFICANT CHANGE UP (ref 30–115)
ALT FLD-CCNC: 28 U/L — SIGNIFICANT CHANGE UP (ref 0–41)
ANION GAP SERPL CALC-SCNC: 11 MMOL/L — SIGNIFICANT CHANGE UP (ref 7–14)
APTT BLD: 30.9 SEC — SIGNIFICANT CHANGE UP (ref 27–39.2)
AST SERPL-CCNC: 21 U/L — SIGNIFICANT CHANGE UP (ref 0–41)
BASOPHILS # BLD AUTO: 0.04 K/UL — SIGNIFICANT CHANGE UP (ref 0–0.2)
BASOPHILS NFR BLD AUTO: 0.8 % — SIGNIFICANT CHANGE UP (ref 0–1)
BILIRUB SERPL-MCNC: 0.3 MG/DL — SIGNIFICANT CHANGE UP (ref 0.2–1.2)
BLD GP AB SCN SERPL QL: SIGNIFICANT CHANGE UP
BUN SERPL-MCNC: 19 MG/DL — SIGNIFICANT CHANGE UP (ref 10–20)
CALCIUM SERPL-MCNC: 9.3 MG/DL — SIGNIFICANT CHANGE UP (ref 8.4–10.5)
CHLORIDE SERPL-SCNC: 102 MMOL/L — SIGNIFICANT CHANGE UP (ref 98–110)
CO2 SERPL-SCNC: 28 MMOL/L — SIGNIFICANT CHANGE UP (ref 17–32)
CREAT SERPL-MCNC: 0.9 MG/DL — SIGNIFICANT CHANGE UP (ref 0.7–1.5)
EGFR: 78 ML/MIN/1.73M2 — SIGNIFICANT CHANGE UP
EGFR: 78 ML/MIN/1.73M2 — SIGNIFICANT CHANGE UP
EOSINOPHIL # BLD AUTO: 0.39 K/UL — SIGNIFICANT CHANGE UP (ref 0–0.7)
EOSINOPHIL NFR BLD AUTO: 7.6 % — SIGNIFICANT CHANGE UP (ref 0–8)
ESTIMATED AVERAGE GLUCOSE: 123 MG/DL — HIGH (ref 68–114)
GLUCOSE SERPL-MCNC: 105 MG/DL — HIGH (ref 70–99)
HCT VFR BLD CALC: 41.8 % — SIGNIFICANT CHANGE UP (ref 37–47)
HGB BLD-MCNC: 13.7 G/DL — SIGNIFICANT CHANGE UP (ref 12–16)
IMM GRANULOCYTES NFR BLD AUTO: 0.2 % — SIGNIFICANT CHANGE UP (ref 0.1–0.3)
INR BLD: 0.84 RATIO — SIGNIFICANT CHANGE UP (ref 0.65–1.3)
LYMPHOCYTES # BLD AUTO: 1.54 K/UL — SIGNIFICANT CHANGE UP (ref 1.2–3.4)
LYMPHOCYTES # BLD AUTO: 30 % — SIGNIFICANT CHANGE UP (ref 20.5–51.1)
MCHC RBC-ENTMCNC: 29.5 PG — SIGNIFICANT CHANGE UP (ref 27–31)
MCHC RBC-ENTMCNC: 32.8 G/DL — SIGNIFICANT CHANGE UP (ref 32–37)
MCV RBC AUTO: 89.9 FL — SIGNIFICANT CHANGE UP (ref 81–99)
MONOCYTES # BLD AUTO: 0.44 K/UL — SIGNIFICANT CHANGE UP (ref 0.1–0.6)
MONOCYTES NFR BLD AUTO: 8.6 % — SIGNIFICANT CHANGE UP (ref 1.7–9.3)
MRSA PCR RESULT.: NEGATIVE — SIGNIFICANT CHANGE UP
NEUTROPHILS # BLD AUTO: 2.71 K/UL — SIGNIFICANT CHANGE UP (ref 1.4–6.5)
NEUTROPHILS NFR BLD AUTO: 52.8 % — SIGNIFICANT CHANGE UP (ref 42.2–75.2)
NRBC BLD AUTO-RTO: 0 /100 WBCS — SIGNIFICANT CHANGE UP (ref 0–0)
PLATELET # BLD AUTO: 216 K/UL — SIGNIFICANT CHANGE UP (ref 130–400)
PMV BLD: 10.7 FL — HIGH (ref 7.4–10.4)
POTASSIUM SERPL-MCNC: 4.7 MMOL/L — SIGNIFICANT CHANGE UP (ref 3.5–5)
POTASSIUM SERPL-SCNC: 4.7 MMOL/L — SIGNIFICANT CHANGE UP (ref 3.5–5)
PROT SERPL-MCNC: 6.8 G/DL — SIGNIFICANT CHANGE UP (ref 6–8)
PROTHROM AB SERPL-ACNC: 9.9 SEC — LOW (ref 9.95–12.87)
RBC # BLD: 4.65 M/UL — SIGNIFICANT CHANGE UP (ref 4.2–5.4)
RBC # FLD: 13.3 % — SIGNIFICANT CHANGE UP (ref 11.5–14.5)
SODIUM SERPL-SCNC: 141 MMOL/L — SIGNIFICANT CHANGE UP (ref 135–146)
WBC # BLD: 5.13 K/UL — SIGNIFICANT CHANGE UP (ref 4.8–10.8)
WBC # FLD AUTO: 5.13 K/UL — SIGNIFICANT CHANGE UP (ref 4.8–10.8)

## 2025-07-22 PROCEDURE — 99214 OFFICE O/P EST MOD 30 MIN: CPT | Mod: 25

## 2025-07-22 PROCEDURE — 73502 X-RAY EXAM HIP UNI 2-3 VIEWS: CPT | Mod: 26,RT

## 2025-07-22 PROCEDURE — 93005 ELECTROCARDIOGRAM TRACING: CPT

## 2025-07-22 PROCEDURE — 87640 STAPH A DNA AMP PROBE: CPT

## 2025-07-22 PROCEDURE — 86850 RBC ANTIBODY SCREEN: CPT

## 2025-07-22 PROCEDURE — 73502 X-RAY EXAM HIP UNI 2-3 VIEWS: CPT | Mod: RT

## 2025-07-22 PROCEDURE — 85610 PROTHROMBIN TIME: CPT

## 2025-07-22 PROCEDURE — 85025 COMPLETE CBC W/AUTO DIFF WBC: CPT

## 2025-07-22 PROCEDURE — 85730 THROMBOPLASTIN TIME PARTIAL: CPT

## 2025-07-22 PROCEDURE — 86900 BLOOD TYPING SEROLOGIC ABO: CPT

## 2025-07-22 PROCEDURE — 86901 BLOOD TYPING SEROLOGIC RH(D): CPT

## 2025-07-22 PROCEDURE — 80053 COMPREHEN METABOLIC PANEL: CPT

## 2025-07-22 PROCEDURE — 36415 COLL VENOUS BLD VENIPUNCTURE: CPT

## 2025-07-22 PROCEDURE — 83036 HEMOGLOBIN GLYCOSYLATED A1C: CPT

## 2025-07-22 PROCEDURE — 87641 MR-STAPH DNA AMP PROBE: CPT

## 2025-07-22 PROCEDURE — 93010 ELECTROCARDIOGRAM REPORT: CPT

## 2025-07-23 DIAGNOSIS — Z01.818 ENCOUNTER FOR OTHER PREPROCEDURAL EXAMINATION: ICD-10-CM

## 2025-07-23 DIAGNOSIS — M16.11 UNILATERAL PRIMARY OSTEOARTHRITIS, RIGHT HIP: ICD-10-CM

## 2025-07-23 LAB
BASOPHILS # BLD AUTO: 0.04 K/UL — SIGNIFICANT CHANGE UP (ref 0–0.2)
BASOPHILS NFR BLD AUTO: 0.7 % — SIGNIFICANT CHANGE UP (ref 0–2)
EOSINOPHIL # BLD AUTO: 0.43 K/UL — SIGNIFICANT CHANGE UP (ref 0–0.5)
EOSINOPHIL NFR BLD AUTO: 8 % — HIGH (ref 0–6)
HCT VFR BLD CALC: 41.1 % — SIGNIFICANT CHANGE UP (ref 34.5–45)
HGB BLD-MCNC: 13.8 G/DL — SIGNIFICANT CHANGE UP (ref 11.5–15.5)
IMM GRANULOCYTES NFR BLD AUTO: 0.2 % — SIGNIFICANT CHANGE UP (ref 0–0.9)
LYMPHOCYTES # BLD AUTO: 1.62 K/UL — SIGNIFICANT CHANGE UP (ref 1–3.3)
LYMPHOCYTES # BLD AUTO: 30.1 % — SIGNIFICANT CHANGE UP (ref 13–44)
MCHC RBC-ENTMCNC: 29.9 PG — SIGNIFICANT CHANGE UP (ref 27–34)
MCHC RBC-ENTMCNC: 33.6 G/DL — SIGNIFICANT CHANGE UP (ref 32–36)
MCV RBC AUTO: 89 FL — SIGNIFICANT CHANGE UP (ref 80–100)
MONOCYTES # BLD AUTO: 0.4 K/UL — SIGNIFICANT CHANGE UP (ref 0–0.9)
MONOCYTES NFR BLD AUTO: 7.4 % — SIGNIFICANT CHANGE UP (ref 2–14)
NEUTROPHILS # BLD AUTO: 2.88 K/UL — SIGNIFICANT CHANGE UP (ref 1.8–7.4)
NEUTROPHILS NFR BLD AUTO: 53.6 % — SIGNIFICANT CHANGE UP (ref 43–77)
PLATELET # BLD AUTO: 228 K/UL — SIGNIFICANT CHANGE UP (ref 150–400)
RBC # BLD: 4.62 M/UL — SIGNIFICANT CHANGE UP (ref 3.8–5.2)
RBC # FLD: 13.8 % — SIGNIFICANT CHANGE UP (ref 10.3–14.5)
WBC # BLD: 5.38 K/UL — SIGNIFICANT CHANGE UP (ref 3.8–10.5)
WBC # FLD AUTO: 5.38 K/UL — SIGNIFICANT CHANGE UP (ref 3.8–10.5)

## 2025-07-29 ENCOUNTER — NON-APPOINTMENT (OUTPATIENT)
Age: 50
End: 2025-07-29

## 2025-08-08 ENCOUNTER — APPOINTMENT (OUTPATIENT)
Dept: ORTHOPEDIC SURGERY | Facility: HOSPITAL | Age: 50
End: 2025-08-08

## 2025-08-08 ENCOUNTER — OUTPATIENT (OUTPATIENT)
Dept: OUTPATIENT SERVICES | Facility: HOSPITAL | Age: 50
LOS: 1 days | Discharge: HOME | End: 2025-08-08
Payer: COMMERCIAL

## 2025-08-08 ENCOUNTER — TRANSCRIPTION ENCOUNTER (OUTPATIENT)
Age: 50
End: 2025-08-08

## 2025-08-08 ENCOUNTER — RESULT REVIEW (OUTPATIENT)
Age: 50
End: 2025-08-08

## 2025-08-08 VITALS — HEART RATE: 72 BPM | DIASTOLIC BLOOD PRESSURE: 77 MMHG | RESPIRATION RATE: 17 BRPM | SYSTOLIC BLOOD PRESSURE: 112 MMHG

## 2025-08-08 VITALS
TEMPERATURE: 98 F | DIASTOLIC BLOOD PRESSURE: 88 MMHG | HEIGHT: 65 IN | OXYGEN SATURATION: 98 % | HEART RATE: 85 BPM | RESPIRATION RATE: 18 BRPM | WEIGHT: 199.96 LBS | SYSTOLIC BLOOD PRESSURE: 168 MMHG

## 2025-08-08 DIAGNOSIS — Z98.891 HISTORY OF UTERINE SCAR FROM PREVIOUS SURGERY: Chronic | ICD-10-CM

## 2025-08-08 DIAGNOSIS — M16.11 UNILATERAL PRIMARY OSTEOARTHRITIS, RIGHT HIP: ICD-10-CM

## 2025-08-08 LAB — GLUCOSE BLDC GLUCOMTR-MCNC: 111 MG/DL — HIGH (ref 70–99)

## 2025-08-08 PROCEDURE — 97165 OT EVAL LOW COMPLEX 30 MIN: CPT | Mod: GO

## 2025-08-08 PROCEDURE — C1776: CPT

## 2025-08-08 PROCEDURE — 88311 DECALCIFY TISSUE: CPT

## 2025-08-08 PROCEDURE — 97162 PT EVAL MOD COMPLEX 30 MIN: CPT | Mod: GP

## 2025-08-08 PROCEDURE — 88311 DECALCIFY TISSUE: CPT | Mod: 26

## 2025-08-08 PROCEDURE — 88313 SPECIAL STAINS GROUP 2: CPT

## 2025-08-08 PROCEDURE — 88313 SPECIAL STAINS GROUP 2: CPT | Mod: 26

## 2025-08-08 PROCEDURE — 73501 X-RAY EXAM HIP UNI 1 VIEW: CPT | Mod: RT

## 2025-08-08 PROCEDURE — 88304 TISSUE EXAM BY PATHOLOGIST: CPT

## 2025-08-08 PROCEDURE — 88304 TISSUE EXAM BY PATHOLOGIST: CPT | Mod: 26

## 2025-08-08 PROCEDURE — 27130 TOTAL HIP ARTHROPLASTY: CPT | Mod: RT

## 2025-08-08 PROCEDURE — 82962 GLUCOSE BLOOD TEST: CPT

## 2025-08-08 RX ORDER — SENNA 187 MG
2 TABLET ORAL AT BEDTIME
Refills: 0 | Status: DISCONTINUED | OUTPATIENT
Start: 2025-08-08 | End: 2025-08-08

## 2025-08-08 RX ORDER — SENNA 187 MG
2 TABLET ORAL
Qty: 30 | Refills: 0
Start: 2025-08-08 | End: 2025-08-22

## 2025-08-08 RX ORDER — TRAMADOL HYDROCHLORIDE 50 MG/1
50 TABLET, FILM COATED ORAL EVERY 6 HOURS
Refills: 0 | Status: DISCONTINUED | OUTPATIENT
Start: 2025-08-08 | End: 2025-08-08

## 2025-08-08 RX ORDER — KETOROLAC TROMETHAMINE 30 MG/ML
15 INJECTION, SOLUTION INTRAMUSCULAR; INTRAVENOUS EVERY 6 HOURS
Refills: 0 | Status: COMPLETED | OUTPATIENT
Start: 2025-08-08 | End: 2025-08-09

## 2025-08-08 RX ORDER — CEFAZOLIN SODIUM IN 0.9 % NACL 3 G/100 ML
2000 INTRAVENOUS SOLUTION, PIGGYBACK (ML) INTRAVENOUS EVERY 8 HOURS
Refills: 0 | Status: DISCONTINUED | OUTPATIENT
Start: 2025-08-08 | End: 2025-08-08

## 2025-08-08 RX ORDER — OXYCODONE HYDROCHLORIDE 30 MG/1
5 TABLET ORAL ONCE
Refills: 0 | Status: DISCONTINUED | OUTPATIENT
Start: 2025-08-08 | End: 2025-08-08

## 2025-08-08 RX ORDER — SODIUM CHLORIDE 9 G/1000ML
1000 INJECTION, SOLUTION INTRAVENOUS
Refills: 0 | Status: DISCONTINUED | OUTPATIENT
Start: 2025-08-08 | End: 2025-08-08

## 2025-08-08 RX ORDER — MAGNESIUM HYDROXIDE 400 MG/5ML
30 SUSPENSION ORAL DAILY
Refills: 0 | Status: DISCONTINUED | OUTPATIENT
Start: 2025-08-08 | End: 2025-08-08

## 2025-08-08 RX ORDER — IBUPROFEN 200 MG
1 TABLET ORAL
Qty: 30 | Refills: 0
Start: 2025-08-08 | End: 2025-08-17

## 2025-08-08 RX ORDER — CEFDINIR 250 MG/5ML
1 POWDER, FOR SUSPENSION ORAL
Qty: 10 | Refills: 0
Start: 2025-08-08 | End: 2025-08-12

## 2025-08-08 RX ORDER — TRAMADOL HYDROCHLORIDE 50 MG/1
1 TABLET, FILM COATED ORAL
Qty: 28 | Refills: 0
Start: 2025-08-08 | End: 2025-08-14

## 2025-08-08 RX ORDER — ACETAMINOPHEN 500 MG/5ML
2 LIQUID (ML) ORAL
Qty: 112 | Refills: 0
Start: 2025-08-08 | End: 2025-08-21

## 2025-08-08 RX ORDER — HYDROMORPHONE/SOD CHLOR,ISO/PF 2 MG/10 ML
0.5 SYRINGE (ML) INJECTION
Refills: 0 | Status: DISCONTINUED | OUTPATIENT
Start: 2025-08-08 | End: 2025-08-08

## 2025-08-08 RX ORDER — CELECOXIB 50 MG/1
400 CAPSULE ORAL ONCE
Refills: 0 | Status: COMPLETED | OUTPATIENT
Start: 2025-08-08 | End: 2025-08-08

## 2025-08-08 RX ORDER — ACETAMINOPHEN 500 MG/5ML
1000 LIQUID (ML) ORAL ONCE
Refills: 0 | Status: COMPLETED | OUTPATIENT
Start: 2025-08-08 | End: 2025-08-08

## 2025-08-08 RX ORDER — ASPIRIN 325 MG
81 TABLET ORAL
Refills: 0 | Status: DISCONTINUED | OUTPATIENT
Start: 2025-08-08 | End: 2025-08-08

## 2025-08-08 RX ORDER — POLYETHYLENE GLYCOL 3350 17 G/17G
17 POWDER, FOR SOLUTION ORAL AT BEDTIME
Refills: 0 | Status: DISCONTINUED | OUTPATIENT
Start: 2025-08-08 | End: 2025-08-08

## 2025-08-08 RX ORDER — ONDANSETRON HCL/PF 4 MG/2 ML
4 VIAL (ML) INJECTION EVERY 6 HOURS
Refills: 0 | Status: DISCONTINUED | OUTPATIENT
Start: 2025-08-08 | End: 2025-08-08

## 2025-08-08 RX ORDER — ACETAMINOPHEN 500 MG/5ML
650 LIQUID (ML) ORAL EVERY 6 HOURS
Refills: 0 | Status: DISCONTINUED | OUTPATIENT
Start: 2025-08-08 | End: 2025-08-08

## 2025-08-08 RX ORDER — ASPIRIN 325 MG
1 TABLET ORAL
Qty: 60 | Refills: 0
Start: 2025-08-08 | End: 2025-09-06

## 2025-08-08 RX ADMIN — Medication 650 MILLIGRAM(S): at 14:29

## 2025-08-08 RX ADMIN — Medication 1000 MILLIGRAM(S): at 11:00

## 2025-08-08 RX ADMIN — Medication 100 MILLIGRAM(S): at 14:21

## 2025-08-08 RX ADMIN — Medication 1000 MILLIGRAM(S): at 06:50

## 2025-08-08 RX ADMIN — Medication 650 MILLIGRAM(S): at 12:14

## 2025-08-08 RX ADMIN — CELECOXIB 400 MILLIGRAM(S): 50 CAPSULE ORAL at 11:00

## 2025-08-08 RX ADMIN — CELECOXIB 400 MILLIGRAM(S): 50 CAPSULE ORAL at 06:50

## 2025-08-13 DIAGNOSIS — M16.11 UNILATERAL PRIMARY OSTEOARTHRITIS, RIGHT HIP: ICD-10-CM

## 2025-08-15 ENCOUNTER — NON-APPOINTMENT (OUTPATIENT)
Age: 50
End: 2025-08-15

## 2025-08-17 LAB — SURGICAL PATHOLOGY STUDY: SIGNIFICANT CHANGE UP

## 2025-08-28 ENCOUNTER — APPOINTMENT (OUTPATIENT)
Facility: CLINIC | Age: 50
End: 2025-08-28
Payer: COMMERCIAL

## 2025-08-28 DIAGNOSIS — M16.11 UNILATERAL PRIMARY OSTEOARTHRITIS, RIGHT HIP: ICD-10-CM

## 2025-08-28 PROCEDURE — 73502 X-RAY EXAM HIP UNI 2-3 VIEWS: CPT

## 2025-08-28 PROCEDURE — 99024 POSTOP FOLLOW-UP VISIT: CPT

## 2025-09-04 ENCOUNTER — NON-APPOINTMENT (OUTPATIENT)
Age: 50
End: 2025-09-04

## 2025-09-12 ENCOUNTER — NON-APPOINTMENT (OUTPATIENT)
Age: 50
End: 2025-09-12